# Patient Record
Sex: FEMALE | Race: WHITE | NOT HISPANIC OR LATINO | Employment: UNEMPLOYED | URBAN - METROPOLITAN AREA
[De-identification: names, ages, dates, MRNs, and addresses within clinical notes are randomized per-mention and may not be internally consistent; named-entity substitution may affect disease eponyms.]

---

## 2017-04-11 ENCOUNTER — HOSPITAL ENCOUNTER (EMERGENCY)
Facility: MEDICAL CENTER | Age: 32
End: 2017-04-11
Attending: EMERGENCY MEDICINE
Payer: MEDICAID

## 2017-04-11 ENCOUNTER — APPOINTMENT (OUTPATIENT)
Dept: RADIOLOGY | Facility: MEDICAL CENTER | Age: 32
End: 2017-04-11
Attending: EMERGENCY MEDICINE
Payer: MEDICAID

## 2017-04-11 VITALS
SYSTOLIC BLOOD PRESSURE: 123 MMHG | DIASTOLIC BLOOD PRESSURE: 81 MMHG | HEIGHT: 67 IN | HEART RATE: 92 BPM | RESPIRATION RATE: 16 BRPM | BODY MASS INDEX: 27.23 KG/M2 | WEIGHT: 173.5 LBS | TEMPERATURE: 97.9 F | OXYGEN SATURATION: 97 %

## 2017-04-11 DIAGNOSIS — R10.31 RIGHT LOWER QUADRANT ABDOMINAL PAIN: ICD-10-CM

## 2017-04-11 DIAGNOSIS — F11.20 NARCOTIC DEPENDENCE (HCC): ICD-10-CM

## 2017-04-11 LAB
ALBUMIN SERPL BCP-MCNC: 4.7 G/DL (ref 3.2–4.9)
ALBUMIN/GLOB SERPL: 1.5 G/DL
ALP SERPL-CCNC: 34 U/L (ref 30–99)
ALT SERPL-CCNC: 12 U/L (ref 2–50)
ANION GAP SERPL CALC-SCNC: 11 MMOL/L (ref 0–11.9)
APPEARANCE UR: CLEAR
AST SERPL-CCNC: 16 U/L (ref 12–45)
BASOPHILS # BLD AUTO: 0.6 % (ref 0–1.8)
BASOPHILS # BLD: 0.06 K/UL (ref 0–0.12)
BILIRUB SERPL-MCNC: 0.9 MG/DL (ref 0.1–1.5)
BUN SERPL-MCNC: 12 MG/DL (ref 8–22)
CALCIUM SERPL-MCNC: 9.6 MG/DL (ref 8.4–10.2)
CHLORIDE SERPL-SCNC: 107 MMOL/L (ref 96–112)
CO2 SERPL-SCNC: 21 MMOL/L (ref 20–33)
COLOR UR AUTO: NORMAL
CREAT SERPL-MCNC: 0.77 MG/DL (ref 0.5–1.4)
EOSINOPHIL # BLD AUTO: 0.02 K/UL (ref 0–0.51)
EOSINOPHIL NFR BLD: 0.2 % (ref 0–6.9)
ERYTHROCYTE [DISTWIDTH] IN BLOOD BY AUTOMATED COUNT: 42 FL (ref 35.9–50)
GFR SERPL CREATININE-BSD FRML MDRD: >60 ML/MIN/1.73 M 2
GLOBULIN SER CALC-MCNC: 3.1 G/DL (ref 1.9–3.5)
GLUCOSE SERPL-MCNC: 91 MG/DL (ref 65–99)
GLUCOSE UR STRIP.AUTO-MCNC: NEGATIVE MG/DL
HCG SERPL QL: NEGATIVE
HCT VFR BLD AUTO: 46.1 % (ref 37–47)
HGB BLD-MCNC: 16 G/DL (ref 12–16)
IMM GRANULOCYTES # BLD AUTO: 0.06 K/UL (ref 0–0.11)
IMM GRANULOCYTES NFR BLD AUTO: 0.6 % (ref 0–0.9)
KETONES UR STRIP.AUTO-MCNC: 80 MG/DL
LEUKOCYTE ESTERASE UR QL STRIP.AUTO: NEGATIVE
LIPASE SERPL-CCNC: 15 U/L (ref 7–58)
LYMPHOCYTES # BLD AUTO: 2.52 K/UL (ref 1–4.8)
LYMPHOCYTES NFR BLD: 24.3 % (ref 22–41)
MCH RBC QN AUTO: 30.5 PG (ref 27–33)
MCHC RBC AUTO-ENTMCNC: 34.7 G/DL (ref 33.6–35)
MCV RBC AUTO: 87.8 FL (ref 81.4–97.8)
MONOCYTES # BLD AUTO: 0.75 K/UL (ref 0–0.85)
MONOCYTES NFR BLD AUTO: 7.2 % (ref 0–13.4)
NEUTROPHILS # BLD AUTO: 6.98 K/UL (ref 2–7.15)
NEUTROPHILS NFR BLD: 67.1 % (ref 44–72)
NITRITE UR QL STRIP.AUTO: NEGATIVE
NRBC # BLD AUTO: 0 K/UL
NRBC BLD AUTO-RTO: 0 /100 WBC
PH UR STRIP.AUTO: 5 [PH] (ref 5–8)
PLATELET # BLD AUTO: 322 K/UL (ref 164–446)
PMV BLD AUTO: 9 FL (ref 9–12.9)
POTASSIUM SERPL-SCNC: 3.9 MMOL/L (ref 3.6–5.5)
PROT SERPL-MCNC: 7.8 G/DL (ref 6–8.2)
PROT UR QL STRIP: NORMAL MG/DL
RBC # BLD AUTO: 5.25 M/UL (ref 4.2–5.4)
RBC UR QL AUTO: NORMAL
SODIUM SERPL-SCNC: 139 MMOL/L (ref 135–145)
SP GR UR STRIP.AUTO: 1.03
WBC # BLD AUTO: 10.4 K/UL (ref 4.8–10.8)

## 2017-04-11 PROCEDURE — 99285 EMERGENCY DEPT VISIT HI MDM: CPT

## 2017-04-11 PROCEDURE — 74176 CT ABD & PELVIS W/O CONTRAST: CPT

## 2017-04-11 PROCEDURE — 81002 URINALYSIS NONAUTO W/O SCOPE: CPT | Performed by: EMERGENCY MEDICINE

## 2017-04-11 PROCEDURE — 85025 COMPLETE CBC W/AUTO DIFF WBC: CPT

## 2017-04-11 PROCEDURE — 80053 COMPREHEN METABOLIC PANEL: CPT

## 2017-04-11 PROCEDURE — 700105 HCHG RX REV CODE 258: Performed by: EMERGENCY MEDICINE

## 2017-04-11 PROCEDURE — 81025 URINE PREGNANCY TEST: CPT

## 2017-04-11 PROCEDURE — 96375 TX/PRO/DX INJ NEW DRUG ADDON: CPT

## 2017-04-11 PROCEDURE — 84703 CHORIONIC GONADOTROPIN ASSAY: CPT

## 2017-04-11 PROCEDURE — 76830 TRANSVAGINAL US NON-OB: CPT

## 2017-04-11 PROCEDURE — 83690 ASSAY OF LIPASE: CPT

## 2017-04-11 PROCEDURE — 700111 HCHG RX REV CODE 636 W/ 250 OVERRIDE (IP): Performed by: EMERGENCY MEDICINE

## 2017-04-11 PROCEDURE — 96376 TX/PRO/DX INJ SAME DRUG ADON: CPT

## 2017-04-11 PROCEDURE — 96374 THER/PROPH/DIAG INJ IV PUSH: CPT

## 2017-04-11 RX ORDER — SODIUM CHLORIDE 9 MG/ML
1000 INJECTION, SOLUTION INTRAVENOUS ONCE
Status: COMPLETED | OUTPATIENT
Start: 2017-04-11 | End: 2017-04-11

## 2017-04-11 RX ORDER — KETOROLAC TROMETHAMINE 30 MG/ML
30 INJECTION, SOLUTION INTRAMUSCULAR; INTRAVENOUS ONCE
Status: COMPLETED | OUTPATIENT
Start: 2017-04-11 | End: 2017-04-11

## 2017-04-11 RX ORDER — OXYCODONE AND ACETAMINOPHEN 10; 325 MG/1; MG/1
1 TABLET ORAL EVERY 6 HOURS PRN
Status: ON HOLD | COMMUNITY
End: 2018-10-06

## 2017-04-11 RX ORDER — IBUPROFEN 600 MG/1
600 TABLET ORAL
Qty: 20 TAB | Refills: 0 | Status: SHIPPED | OUTPATIENT
Start: 2017-04-11 | End: 2018-02-16

## 2017-04-11 RX ORDER — MORPHINE SULFATE 4 MG/ML
4 INJECTION, SOLUTION INTRAMUSCULAR; INTRAVENOUS
Status: COMPLETED | OUTPATIENT
Start: 2017-04-11 | End: 2017-04-11

## 2017-04-11 RX ORDER — ONDANSETRON 4 MG/1
4 TABLET, ORALLY DISINTEGRATING ORAL EVERY 8 HOURS PRN
Qty: 12 TAB | Refills: 0 | Status: SHIPPED | OUTPATIENT
Start: 2017-04-11 | End: 2018-02-16

## 2017-04-11 RX ORDER — ONDANSETRON 2 MG/ML
4 INJECTION INTRAMUSCULAR; INTRAVENOUS
Status: DISCONTINUED | OUTPATIENT
Start: 2017-04-11 | End: 2017-04-11 | Stop reason: HOSPADM

## 2017-04-11 RX ADMIN — SODIUM CHLORIDE 1000 ML: 9 INJECTION, SOLUTION INTRAVENOUS at 15:03

## 2017-04-11 RX ADMIN — MORPHINE SULFATE 4 MG: 4 INJECTION INTRAVENOUS at 15:04

## 2017-04-11 RX ADMIN — MORPHINE SULFATE 4 MG: 4 INJECTION INTRAVENOUS at 16:21

## 2017-04-11 RX ADMIN — KETOROLAC TROMETHAMINE 30 MG: 30 INJECTION, SOLUTION INTRAMUSCULAR at 17:24

## 2017-04-11 RX ADMIN — ONDANSETRON 4 MG: 2 INJECTION INTRAMUSCULAR; INTRAVENOUS at 15:04

## 2017-04-11 NOTE — ED AVS SNAPSHOT
Interactive Fate Access Code: [email or web address]  Expires: 5/11/2017  3:53 PM    Interactive Fate  A secure, online tool to manage your health information     [employer]’s Interactive Fate® is a secure, online tool that connects you to your personalized health information from the privacy of your home -- day or night - making it very easy for you to manage your healthcare. Once the activation process is completed, you can even access your medical information using the Interactive Fate oumar, which is available for free in the Apple Oumar store or Google Play store.     Interactive Fate provides the following levels of access (as shown below):   My Chart Features   [facility] Primary Care Doctor [facility]  Specialists [facility]  Urgent  Care Non-[facility]  Primary Care  Doctor   Email your healthcare team securely and privately 24/7 X X X X   Manage appointments: schedule your next appointment; view details of past/upcoming appointments X      Request prescription refills. X      View recent personal medical records, including lab and immunizations X X X X   View health record, including health history, allergies, medications X X X X   Read reports about your outpatient visits, procedures, consult and ER notes X X X X   See your discharge summary, which is a recap of your hospital and/or ER visit that includes your diagnosis, lab results, and care plan. X X       How to register for Interactive Fate:  1. Go to  [email or web address].  2. Click on the Sign Up Now box, which takes you to the New Member Sign Up page. You will need to provide the following information:  a. Enter your Interactive Fate Access Code exactly as it appears at the top of this page. (You will not need to use this code after you’ve completed the sign-up process. If you do not sign up before the expiration date, you must request a new code.)   b. Enter your date of birth.   c. Enter your home email address.   d. Click Submit, and follow the next screen’s instructions.  3. Create a Interactive Fate ID. This will be your Interactive Fate  login ID and cannot be changed, so think of one that is secure and easy to remember.  4. Create a [employer] password. You can change your password at any time.  5. Enter your Password Reset Question and Answer. This can be used at a later time if you forget your password.   6. Enter your e-mail address. This allows you to receive e-mail notifications when new information is available in Rosum.  7. Click Sign Up. You can now view your health information.    For assistance activating your [employer] account, call [phone]

## 2017-04-11 NOTE — ED NOTES
Pt to rm 22 from [facility], ambulates without assistance, agree with triage note , pt placed in gown

## 2017-04-11 NOTE — ED AVS SNAPSHOT
Home Care Instructions                                                                                                                Ally Dickerson   MRN: [id number]    Department:  [facility], Emergency Dept   Date of Visit:  4/11/2017            [facility], Emergency Dept    [address]    [place] [address]    Phone:  [phone]      You were seen by     Juan Manuel Ballesteros M.D.      Your Diagnosis Was     Right lower quadrant abdominal pain     R10.31       These are the medications you received during your hospitalization from 04/11/2017 1254 to 04/11/2017 1720     Date/Time Order Dose Route Action    04/11/2017 1503 NS infusion 1,000 mL 1,000 mL Intravenous New Bag    04/11/2017 1621 morphine (pf) 4 mg/ml injection 4 mg 4 mg Intravenous Given    04/11/2017 1504 morphine (pf) 4 mg/ml injection 4 mg 4 mg Intravenous Given    04/11/2017 1504 ondansetron (ZOFRAN) syringe/vial injection 4 mg 4 mg Intravenous Given      Follow-up Information     1. Follow up with [facility].    Specialty:  Family Medicine    Contact information    [address]  [address]  [place] [address]  [phone]          2. Follow up with Corinne E Capurro, M.D..    Specialty:  OB/Gyn    Contact information    [address]  [address]  [place] [address]  [phone]        Medication Information     Review all of your home medications and newly ordered medications with your primary doctor and/or pharmacist as soon as possible. Follow medication instructions as directed by your doctor and/or pharmacist.     Please keep your complete medication list with you and share with your physician. Update the information when medications are discontinued, doses are changed, or new medications (including over-the-counter products) are added; and carry medication information at all times in the event of emergency situations.               Medication List      START taking these medications        Instructions       Morning Afternoon Evening Bedtime    ibuprofen 600 MG Tabs   Commonly known as:  MOTRIN        Take 1 Tab by mouth 3 times a day, with meals.   Dose:  600 mg                        ondansetron 4 MG Tbdp   Commonly known as:  ZOFRAN ODT        Take 1 Tab by mouth every 8 hours as needed.   Dose:  4 mg                          ASK your doctor about these medications        Instructions    Morning Afternoon Evening Bedtime    alprazolam 0.5 MG Tabs   Commonly known as:  XANAX        Take 0.5 mg by mouth 3 times a day as needed for Sleep.   Dose:  0.5 mg                        oxycodone-acetaminophen  MG Tabs   Commonly known as:  PERCOCET-10        Take 1-2 Tabs by mouth every 6 hours as needed for Severe Pain.   Dose:  1-2 Tab                             Where to Get Your Medications      You can get these medications from any pharmacy     Bring a paper prescription for each of these medications    - ibuprofen 600 MG Tabs  - ondansetron 4 MG Tbdp            Procedures and tests performed during your visit     CBC WITH DIFFERENTIAL    COMP METABOLIC PANEL    CT-RENAL COLIC EVALUATION(A/P W/O)    ESTIMATED GFR    HCG QUAL SERUM    IV Saline Lock    LIPASE    POC URINALYSIS    POC URINE PREGNANCY    US-GYN-PELVIS TRANSVAGINAL        Discharge Instructions       Abdominal Pain, Adult  Many things can cause belly (abdominal) pain. Most times, the belly pain is not dangerous. Many cases of belly pain can be watched and treated at home.  HOME CARE   · Do not take medicines that help you go poop (laxatives) unless told to by your doctor.  · Only take medicine as told by your doctor.  · Eat or drink as told by your doctor. Your doctor will tell you if you should be on a special diet.  GET HELP IF:  · You do not know what is causing your belly pain.  · You have belly pain while you are sick to your stomach (nauseous) or have runny poop (diarrhea).  · You have pain while you pee or poop.  · Your belly pain wakes you up  at night.  · You have belly pain that gets worse or better when you eat.  · You have belly pain that gets worse when you eat fatty foods.  · You have a fever.  GET HELP RIGHT AWAY IF:   · The pain does not go away within 2 hours.  · You keep throwing up (vomiting).  · The pain changes and is only in the right or left part of the belly.  · You have bloody or tarry looking poop.  MAKE SURE YOU:   · Understand these instructions.  · Will watch your condition.  · Will get help right away if you are not doing well or get worse.     This information is not intended to replace advice given to you by your health care provider. Make sure you discuss any questions you have with your health care provider.     Document Released: 06/05/2009 Document Revised: 01/08/2016 Document Reviewed: 08/27/2014  Department of Health and Human Services Interactive Patient Education ©2016 Department of Health and Human Services Inc.            Patient Information     Patient Information    Following emergency treatment: all patient requiring follow-up care must return either to a private physician or a clinic if your condition worsens before you are able to obtain further medical attention, please return to the emergency room.     Billing Information    At [facility], we work to make the billing process streamlined for our patients.  Our Representatives are here to answer any questions you may have regarding your hospital bill.  If you have insurance coverage and have supplied your insurance information to us, we will submit a claim to your insurer on your behalf.  Should you have any questions regarding your bill, we can be reached online or by phone as follows:  Online: You are able pay your bills online or live chat with our representatives about any billing questions you may have. We are here to help Monday - Friday from 8:00am to 7:30pm and 9:00am - 12:00pm on Saturdays.  Please visit [email or web address]  for more information.   Phone:  [phone] or  [phone]    Please note that your emergency physician, surgeon, pathologist, radiologist, anesthesiologist, and other specialists are not employed by [facility] and will therefore bill separately for their services.  Please contact them directly for any questions concerning their bills at the numbers below:     Emer[facility] Physician Services:  [phone]  [facility]:  [phone]  [facility]:  [phone]  [facility]:  [phone]    1. Your final bill may vary from the amount quoted upon discharge if all procedures are not complete at that time, or if your doctor has additional procedures of which we are not aware. You will receive an additional bill if you return to the Emergency Department at [facility] for suture removal regardless of the facility of which the sutures were placed.     2. Please arrange for settlement of this account at the emergency registration.    3. All self-pay accounts are due in full at the time of treatment.  If you are unable to meet this obligation then payment is expected within 4-5 days.     4. If you have had radiology studies (CT, X-ray, Ultrasound, MRI), you have received a preliminary result during your emergency department visit. Please contact the radiology department [phone] to receive a copy of your final result. Please discuss the Final result with your primary physician or with the follow up physician provided.     Crisis Hotline:  [place] Crisis Hotline:  3-828-KZHVGLB or 1-853.139.3895  [place] Crisis Hotline:    1-268.619.1019 or 921-935-7920         ED Discharge Follow Up Questions    1. In order to provide you with very good care, we would like to follow up with a phone call in the next few days.  May we have your permission to contact you?     YES /  NO    2. What is the best phone number to call you? (       )_____-__________    3. What is the best time to call you?      Morning  /  Afternoon  /   Evening                   Patient Signature:  ____________________________________________________________    Date:  ____________________________________________________________

## 2017-04-11 NOTE — ED PROVIDER NOTES
ED Provider Note    CHIEF COMPLAINT  Chief Complaint   Patient presents with   • RLQ Pain     hx polycystic ovary disease, pain x 2 days   • N/V   • Chills       HPI  Ally Dickerson is a 31 y.o. female who presents complaining of abdominal pain. The patient had some discomfort for the last 3 days or so. However early this morning she has sudden onset of severe pain in the right lower quadrant. She describes this as a stabbing sensation. It does not radiate towards her flank, her vagina, leg. Nothing makes it better, nothing makes it worse. She's had problems with her ovaries before but is never felt anything this previously. She is associated nausea and vomiting. Her menstrual cycles are irregular which is typical for her. She denies any vaginal discharge at all. Denies any dysuria. No stool changes. No weakness or numbness. No chest pain or shortness of breath. There is no other complaint.    PAST MEDICAL HISTORY  Past Medical History   Diagnosis Date   • Bell's palsy    • Endometriosis of other specified sites    • Dental disorder    • Other specified symptom associated with female genital organs      endometriosis; polyscysic syndrome; HPV   • Pain 6/12/15     legs, abdomen, joint pain and facial spasms (8/10 pain)       FAMILY HISTORY  Family History   Problem Relation Age of Onset   • Cancer         SOCIAL HISTORY  Social History   Substance Use Topics   • Smoking status: Current Every Day Smoker -- 1.00 packs/day for 17 years     Types: Cigarettes   • Smokeless tobacco: Not on file   • Alcohol Use: No         SURGICAL HISTORY  Past Surgical History   Procedure Laterality Date   • Pelviscopy  6/13/2015     Procedure: PELVISCOPY CHRONOPERTUBATION - CRYOCAUTERY OF CERVIX;  Surgeon: Dimitris Giles M.D.;  Location: SURGERY [facility];  Service:        CURRENT MEDICATIONS  Home Medications     Reviewed by Yohan Garcia (Pharmacy Tech) on 04/11/17 at 1551  Med List Status: Complete    Medication  "Last Dose Status    alprazolam (XANAX) 0.5 MG TABS 4/1/2017 Active    oxycodone-acetaminophen (PERCOCET-10)  MG Tab 4/11/2017 Active                I reviewed the nursing notes and/or the list of the patient's medications.    ALLERGIES  No Known Allergies    REVIEW OF SYSTEMS  See HPI for further details. Review of systems as above, otherwise all other systems are negative.     PHYSICAL EXAM  VITAL SIGNS: /81 mmHg  Pulse 122  Temp(Src) 36.6 °C (97.9 °F) (Temporal)  Resp 16  Ht 1.702 m (5' 7.01\")  Wt 78.7 kg (173 lb 8 oz)  BMI 27.17 kg/m2  SpO2 97%  Constitutional: Well appearing patient in no acute distress.  Not toxic, nor ill in appearance.  HENT: Mucus membranes moist.  Oropharynx is clear.  Eyes: Pupils equally round.  No scleral icterus.   Neck: Full nontender range of motion.  Lymphatic: No cervical lymphadenopathy noted.   Cardiovascular: Regular heart rate and rhythm.  No murmurs, rubs, nor gallop appreciated.   Thorax & Lungs: Chest is nontender.  Lungs are clear to auscultation with good air movement bilaterally.  No wheeze, rhonchi, nor rales.   Abdomen: Bowel sounds normal. Soft, with focal tenderness in the right lower quadrant. However there is no rebound nor guarding.  No mass, pulsatile mass, nor hepatosplenomegaly appreciated.  No CVA tenderness.  No Alarcon sign.  : Deferred  Skin: No purpura nor petechia noted.  Extremities/Musculoskeletal: No sign of trauma.  Calves are nontender with no cords nor edema.  Neurologic: Alert & oriented.  Strength and sensation is intact all around.  Gait is normal.  Psychiatric: Normal affect appropriate for the clinical situation.    LABS  Labs Reviewed   CBC WITH DIFFERENTIAL   COMP METABOLIC PANEL   LIPASE   HCG QUAL SERUM   ESTIMATED GFR   POC URINE PREGNANCY   POC URINALYSIS         RADIOLOGY/PROCEDURES  I have reviewed the patient's films myself, and they are read out by the radiologist as:   CT-RENAL COLIC EVALUATION(A/P W/O)   Final " Result         1. No urinary tract calculus identified. No renal collecting system in dilatation.      2. No evidence of inflammatory change in the abdomen or pelvis.  The study is however limited due to nonuse of intravenous contrast      3. Normal appendix.      US-GYN-PELVIS TRANSVAGINAL   Final Result            1. Small amount of free fluid in the right adnexa and cul-de-sac.. Otherwise, unremarkable uterus and bilateral ovaries.        .    MEDICAL RECORD  I have reviewed patient's medical record and pertinent results are listed above.    COURSE & MEDICAL DECISION MAKING  This patient presents with abdominal pain. Given IV fluids and pain medicine.  At this point, it is unclear what the cause of the patient's symptoms are. Clinically, the patient is not dehydrated nor do they evidence of a surgical abdomen. The patient has been reevaluated after the primary assessment--including prior to discharge--and still has no evidence of a surgical abdomen. Laboratories are reassuring. There is no pronounced leukocytosis or bandemia. There is no enzymatic evidence of pancreatitis or hepatitis. No evidence of UTI.  The patient is not pregnant, nor is there symptom of PID.  Imaging shows some small free fluid on ultrasound, negative otherwise. Unremarkable CT without evidence of obstruction inflammatory changes, kidney stone.  As I counseled the patient, this could be early in the course of the disease process, such as early appendicitis, and we are simply unable to diagnose it at this time. However, given the duration of her symptoms, I think this is unlikely. I have asked her to follow-up with her personal doctor for recheck, consider a new gynecologist; she previously seen Dr. Dimitris Giles. Patient is on a chronic pain regimen prescribed by Dr. dumont. I've added ibuprofen. Zofran. The structures on belly pain.      FINAL IMPRESSION  1. Right lower quadrant abdominal pain    2. Narcotic dependence ([facility])            This dictation was created using voice recognition software.     Electronically signed by: Juan Manuel Ballesteros, 4/11/2017 3:59 PM

## 2017-04-11 NOTE — ED NOTES
Pt returned from CT. States some pain relief, pt is tearful. Pt is feeling fearful awaiting results. RN reassured the pt and updated on plan of care. Significant other at bedside.

## 2017-04-12 LAB — HCG UR QL: NEGATIVE

## 2017-04-12 NOTE — ED NOTES
Pt discharged home. Assessment complete. Pt ambulates self. VS stable. Pt verbalized discharge instructions. Prescriptions given pt verbalizes teaching provided.

## 2018-02-16 DIAGNOSIS — Z01.812 PRE-OPERATIVE LABORATORY EXAMINATION: ICD-10-CM

## 2018-02-16 LAB
ANION GAP SERPL CALC-SCNC: 8 MMOL/L (ref 0–11.9)
BUN SERPL-MCNC: 13 MG/DL (ref 8–22)
CALCIUM SERPL-MCNC: 10.5 MG/DL (ref 8.5–10.5)
CHLORIDE SERPL-SCNC: 102 MMOL/L (ref 96–112)
CO2 SERPL-SCNC: 23 MMOL/L (ref 20–33)
CREAT SERPL-MCNC: 0.67 MG/DL (ref 0.5–1.4)
ERYTHROCYTE [DISTWIDTH] IN BLOOD BY AUTOMATED COUNT: 43.1 FL (ref 35.9–50)
GLUCOSE SERPL-MCNC: 103 MG/DL (ref 65–99)
HCG SERPL QL: POSITIVE
HCT VFR BLD AUTO: 47.3 % (ref 37–47)
HGB BLD-MCNC: 15.9 G/DL (ref 12–16)
MCH RBC QN AUTO: 30.4 PG (ref 27–33)
MCHC RBC AUTO-ENTMCNC: 33.6 G/DL (ref 33.6–35)
MCV RBC AUTO: 90.4 FL (ref 81.4–97.8)
PLATELET # BLD AUTO: 392 K/UL (ref 164–446)
PMV BLD AUTO: 9.5 FL (ref 9–12.9)
POTASSIUM SERPL-SCNC: 3.5 MMOL/L (ref 3.6–5.5)
RBC # BLD AUTO: 5.23 M/UL (ref 4.2–5.4)
SODIUM SERPL-SCNC: 133 MMOL/L (ref 135–145)
WBC # BLD AUTO: 9.6 K/UL (ref 4.8–10.8)

## 2018-02-16 PROCEDURE — 85027 COMPLETE CBC AUTOMATED: CPT

## 2018-02-16 PROCEDURE — 36415 COLL VENOUS BLD VENIPUNCTURE: CPT

## 2018-02-16 PROCEDURE — 80048 BASIC METABOLIC PNL TOTAL CA: CPT

## 2018-02-16 PROCEDURE — 84703 CHORIONIC GONADOTROPIN ASSAY: CPT

## 2018-02-16 RX ORDER — CHOLECALCIFEROL (VITAMIN D3) 125 MCG
1 CAPSULE ORAL NIGHTLY PRN
COMMUNITY

## 2018-02-16 RX ORDER — HYDROCODONE BITARTRATE AND ACETAMINOPHEN 10; 325 MG/1; MG/1
1 TABLET ORAL PRN
Status: ON HOLD | COMMUNITY
End: 2018-10-06

## 2018-02-17 NOTE — H&P
HISTORY OF PRESENT ILLNESS:  The patient is a 32-year-old white female    0, para 0 who has had pelvic pain for 9 years.  .  The patient's mother is    secondary to ovarian cancer.  The patient's pain is 10/10 on the pain   scale.  The patient complains also of menorrhagia, heavy abnormal uterine   bleeding leading for 13 days using up to 9 pads or tampons in one day.  The   patient had been placed on oral contraceptive pills to control abnormal heavy   uterine bleeding.  The patient voluntarily discontinued her oral contraceptive   pills.  The patient requests a laparoscopically-assisted vaginal   hysterectomy, bilateral salpingo-oophorectomy due to her family history of   ovarian cancer, menorrhagia, and pelvic pain.  The patient states that she   does not wish to have children.  The patient has undergone previous   laparoscopic operation performed by Dr. Dimitris Giles.  Laparoscopic procedure   revealed no spill from the right fallopian tube.    PAST MEDICAL HISTORY:  The patient's past medical history is significant for   abnormal Pap smear, atypical squamous cells of undetermined significance; HPV   high risk positive.  She has a history of infertility and oxycodone   dependence.  The patient's past medical history is significant for Bell's   palsy, endometriosis, tobacco abuse, dental abscess.    CURRENT MEDICATION:  Includes oxycodone (Percocet 10/325 one tab q. 4 hours   provided by another provider).  Patient also takes alprazolam 0.5 mg _____   daily.    PAST SURGICAL HISTORY:  Includes laparoscopic chromoperturbation, cryosurgery   for abnormal Pap smear, diagnostic laparoscopy.    ALLERGIES:  No known allergies.    HABITS:  The patient smokes three-quarter to one pack of cigarettes a day and   has done so for 16 years.  She denies use of alcohol.  She states she has   never been treated for drug abuse and occasionally she will use recreational   drugs as needed for pain, anxiety,  ____.    REVIEW OF SYSTEMS:  CONSTITUTIONAL:  The patient denies any fever, chills, sweats, weight loss,   weakness, or fatigue.  EYES:  She denies any change in vision.  EAR, NOSE AND THROAT:  She denies any change in hearing, nosebleed, sore   throat, or dry mouth.  CARDIOVASCULAR:  She denies any dizziness, shortness breath, chest pain, loss   of consciousness, or heart palpitations.  RESPIRATORY:  The patient denies any chest pain, cough, shortness of breath,   or wheezing.  GASTROINTESTINAL:  She denies any abdominal pain, nausea, vomiting, change in   bowel habits, or change in appetite.  She denies any dark or bloody stools,   indigestion, constipation, or diarrhea.  HEMATOLOGICAL:  She complains of swollen lymph glands and easy bruisability.  GYNECOLOGICAL:  The patient complains of pelvic pain and bleeding and pain   with sexual intercourse.  URINARY:  She denies any painful urination, frequent urination, urgent   urination, blood in the urine, leaking of urination, or nocturia.  MUSCULOSKELETAL:  She complains of back pain, joint pain, joint stiffness, and   joint swelling.  INTEGUMENT:  She denies any hair loss, hair growth, or change in hair texture.    She denies any breast lumps, nipple discharge, or breast pain.  NEUROLOGICAL:  She complains of depression, anxiety, mood swings, and numbness   and tingling as well as Bell's palsy.  ENDOCRINE:  She complains of cold and heat intolerance.  She denies any   excessive thirst, excessive urination, or tremor.    PHYSICAL EXAMINATION:  GENERAL:  This is a well-developed female, in no apparent distress.  VITAL SIGNS:  She is 67.5 inches tall, 169 pounds, blood pressure 110/64,   pulse 81, respirations 16, pulse oximetry 97% on room air.  HEENT:  Head is normocephalic without sign of trauma.  History of Bell's   palsy.  SKIN:  No rashes, changes, or cyanosis.  NECK:  Supple.  Full range of motion.  Trachea midline.  No thyromegaly.  LUNGS:  Clear to  auscultation and percussion.  HEART:  S1, S2 normal.  No S3, S4.  No lifts, rubs, or heaves.  BREASTS:  Reveal no lumps, masses, nipple discharge, supraclavicular   adenopathy, or axial adenopathy.  ABDOMEN:  Flat.  Bowel sounds positive.  No hepatomegaly, no splenomegaly.  No   tenderness, guarding, rigidity, or rebound.  MUSCULOSKELETAL:  Reveals tingling and pain of the face.  PELVIC:  Female escutcheon.  Bartholin, urethral, and [place]'s glands are   normal.  Lymphatic groin nodes are not enlarged.  Vaginal estrogen effect is   present.  Bladder:  No fullness, masses, or tenderness.  Cystocele absent.    Cervix:  Nontender to motion.  Uterus:  7-8 cm in size.  No adnexal masses.    IMPRESSION:  1.  She has had pelvic pain for 9 years.  2.  Menorrhagia.  3.  Dysmenorrhea.  4.  Polycystic ovarian syndrome.  5.  History of mild dysplasia, atypical squamous cells of undetermined   significance Pap smear, human papilloma virus positive.  6.  History of infertility.  7.  Oxycodone dependent.  8.  History of endometriosis.  9.  Tobacco abuse.  10.  Family history of ovarian cancer - mother.  11.  Status post laparoscopy and laparotomy.    RECOMMENDATION:  I have had a long thorough discussion with the patient.  The   patient does not wish to have children.  She understands that she will not be   able to have children following the hysterectomy and bilateral   salpingo-oophorectomy.  Principally, the patient wishes the bilateral   salpingo-oophorectomy due to her family history of ovarian cancer of her   mother.  She also has a history of polycystic ovarian syndrome.  We have   discussed replacement therapy to include either estrogen alone or estrogen and   progesterone replacement therapy.  I have discussed some serious and   significant risks to include but not limited to the risks of anesthesia,   infection, bleeding, injury to the bowel, bladder, ureter, or pelvic vessels.    The patient understands that she will be  menopausal.  I have discussed   postoperative pain treatments.  I have recommended the patient to obtain a   pain specialist.  I have informed the patient that I will not be giving   excessive amounts of oxycodone.  I have given the patient no guarantees, nor   implied, or any guarantees regarding pelvic pain following the surgery.    Notably, this patient has not been guarantee that she will be pain free   following the surgery.  Informed consent was received.  All the patient's   questions were answered to her satisfaction.  I described the procedure of   laparoscopic-assisted vaginal hysterectomy, bilateral salpingo-oophorectomy,   removal of the fallopian tubes, and both ovaries as well as cystoscopy.    Informed consent was received.  All the patient's questions were answered to   her satisfaction.  I have reviewed the patient's previous records from Dr. Giles.  The patient requests laparoscopic-assisted vaginal hysterectomy,   bilateral salpingo-oophorectomy, and cystoscopy.  The patient also requested   excision of a vulvar condyloma at the time of surgery.       ____________________________________     MD ROSELYN FAGAN / LIZETTE    DD:  02/16/2018 22:24:42  DT:  02/16/2018 19:58:33    D#:  [id number]  Job#:  [id number]

## 2018-02-17 NOTE — H&P
HISTORY OF PRESENT ILLNESS:  The patient is a 32-year-old white female,    0, para 0 whose chief complaint is chronic pelvic pain.  The patient   has experienced pelvic pain for 9 years.  The patient's pain is 10/10 on the   pain scale.  The patient's mother is  secondary to ovarian cancer.    The patient complains also of menorrhagia, heavy abnormal uterine bleeding,   bleeding for 13 days using up to 9 pads or tampons in one day.  The patient   had been placed on oral contraceptive pills to control abnormal heavy uterine   bleeding.  The patient voluntarily discontinued her oral contraceptive pills.    The patient requests a laparoscopically-assisted vaginal hysterectomy,   bilateral salpingo-oophorectomy due to her family history of ovarian cancer,   menorrhagia, and pelvic pain.  The patient states that she does not wish to   have children.  The patient has undergone previous laparoscopic operation   performed by Dr. Dimitris Giles.  Laparoscopic procedure revealed no spill from   the right fallopian tube.    PAST MEDICAL HISTORY:  The patient's past medical history is significant for   abnormal Pap smear, atypical squamous cells of undetermined significance; HPV   high risk positive.  She has a history of infertility and oxycodone   dependence.  The patient's past medical history is significant for Bell's   palsy, endometriosis, tobacco abuse, dental abscess.    CURRENT MEDICATION:  Includes oxycodone (Percocet 10/325 one tab q. 4 hours   provided by another provider).  Patient also takes alprazolam 0.5 mg ___   daily.    PAST SURGICAL HISTORY:  Includes laparoscopic chromoperturbation, cryosurgery   for abnormal Pap smear, diagnostic laparoscopy.    ALLERGIES:  No known allergies.    HABITS:  The patient smokes three-quarters to one pack of cigarettes a day and   has done so for 16 years.  She denies use of alcohol.  She states she has   never been treated for drug abuse and occasionally she will  use recreational   drugs as needed for pain, anxiety, and as a sleep aid.    REVIEW OF SYSTEMS:  CONSTITUTIONAL:  The patient denies any fever, chills, sweats, weight loss,   weakness, or fatigue.  EYES:  She denies any change in vision.  EARS, NOSE AND THROAT:  She denies any change in hearing, nosebleed, sore   throat, or dry mouth.  CARDIOVASCULAR:  She denies any dizziness, shortness of breath, chest pain,   loss of consciousness, or heart palpitations.  RESPIRATORY:  The patient denies any chest pain, cough, shortness of breath,   or wheezing.  GASTROINTESTINAL:  She denies any abdominal pain, nausea, vomiting, change in   bowel habits, or change in appetite.  She denies any dark or bloody stools,   indigestion, constipation, or diarrhea.  HEMATOLOGICAL:  She complains of swollen lymph glands and easy bruisability.  GYNECOLOGICAL:  The patient complains of pelvic pain and bleeding and pain   with sexual intercourse.  URINARY:  She denies any painful urination, frequent urination, urgent   urination, blood in the urine, leaking of urination, or nocturia.  MUSCULOSKELETAL:  She complains of back pain, joint pain, joint stiffness, and   joint swelling.  INTEGUMENT:  She denies any hair loss, hair growth, change in hair texture.    She denies any breast lumps, nipple discharge, or breast pain.  NEUROLOGICAL:  She complains of depression, anxiety, mood swings, and numbness   and tingling, as well as Bell's palsy.  ENDOCRINE:  She complains of cold and heat intolerance.  She denies any   excessive thirst, excessive urination, or tremor.    PHYSICAL EXAMINATION:  GENERAL:  This is a well-developed female, in no apparent distress.  VITAL SIGNS:  She is 67.5 inches tall, 169 pounds, blood pressure 110/64,   pulse 81, respirations 16, pulse oximetry 97% on room air.  HEENT:  Head is normocephalic without sign of trauma.  History of Bell's   palsy.  SKIN:  No rashes, changes, or cyanosis.  NECK:  Supple.  Full range of  motion.  Trachea midline.  No thyromegaly.  LUNGS:  Clear to auscultation and percussion.  HEART:  S1, S2 normal.  No S3, S4.  No lifts, rubs, or heaves.  BREASTS:  Reveal no lumps, masses, nipple discharge, supraclavicular   adenopathy, or axial adenopathy.  ABDOMEN:  Flat.  Bowel sounds positive.  No hepatomegaly, no splenomegaly.  No   tenderness, guarding, rigidity, or rebound.  ___:  Reveals tingling and pain of the face.  PELVIC:  Female escutcheon.  Bartholin, urethral, and [place]'s glands are   normal.  Lymphatic:  Groin nodes are not enlarged.  Vaginal estrogen effect is   present.  Bladder:  No fullness, masses, or tenderness.  Cystocele absent.    Cervix:  Nontender to motion.  Uterus:  7-8 cm in size.  No adnexal masses.    IMPRESSION:  1.  She has had pelvic pain for 9 years.  2.  Menorrhagia.  3.  Dysmenorrhea.  4.  Polycystic ovarian syndrome.  5.  History of mild dysplasia, atypical squamous cells of undetermined   significance Pap smear, human papilloma virus positive.  6.  History of infertility.  7.  Oxycodone dependent.  8.  History of endometriosis.  9.  Tobacco abuse.  10.  Family history of ovarian cancer - mother.  11.  Status post laparoscopy and laparotomy.    RECOMMENDATION:  I have had a long thorough discussion with the patient.  The   patient does not wish to have children.  She understands that she will not be   able to have children following the hysterectomy and bilateral   salpingo-oophorectomy.  Principally, the patient wishes the bilateral   salpingo-oophorectomy due to her family history of ovarian cancer of her   mother.  She also has a history of polycystic ovarian syndrome.  We have   discussed replacement therapy to include either estrogen alone or estrogen and   progesterone replacement therapy.  I have discussed some serious and   significant risks to include but not limited to the risks of anesthesia,   infection, bleeding, injury to the bowel, bladder, ureter, or pelvic  vessels.    The patient understands that she will be menopausal.  I have discussed   postoperative pain treatments.  I have recommended the patient to obtain a   pain specialist.  I have informed the patient that I will not be giving   excessive amounts of oxycodone.  I have given the patient no guarantees, nor   implied any guarantees, regarding pelvic pain following the surgery.  In other   words, the patient has not been guaranteed that she will be pain free   following the surgery.  Informed consent was received.  All the patient's   questions were answered to her satisfaction.  I described the procedure of   laparoscopic-assisted vaginal hysterectomy, bilateral salpingo-oophorectomy,   removal of the fallopian tubes and both ovaries, as well as cystoscopy.    Informed consent was received.  All the patient's questions were answered to   her satisfaction.  I have reviewed the patient's previous records from Dr. Giles.  The patient requests laparoscopic-assisted vaginal hysterectomy,   bilateral salpingo-oophorectomy, and cystoscopy.  The patient also requested   excision of a vulvar condyloma at the time of surgery.       ____________________________________     MD ROSELYN FAGAN / LIZETTE    DD:  02/16/2018 17:24:42  DT:  02/16/2018 19:58:33    D#:  [id number]  Job#:  [id number]

## 2018-02-20 ENCOUNTER — HOSPITAL ENCOUNTER (OUTPATIENT)
Facility: MEDICAL CENTER | Age: 33
End: 2018-02-20
Attending: OBSTETRICS & GYNECOLOGY | Admitting: OBSTETRICS & GYNECOLOGY
Payer: MEDICAID

## 2018-02-20 VITALS
HEIGHT: 68 IN | BODY MASS INDEX: 25.46 KG/M2 | DIASTOLIC BLOOD PRESSURE: 72 MMHG | TEMPERATURE: 97.8 F | SYSTOLIC BLOOD PRESSURE: 113 MMHG | OXYGEN SATURATION: 99 % | WEIGHT: 167.99 LBS | RESPIRATION RATE: 18 BRPM

## 2018-02-20 LAB
B-HCG SERPL-ACNC: 9520.5 MIU/ML (ref 0–5)
HCG UR QL: POSITIVE
SP GR UR REFRACTOMETRY: 1.03

## 2018-02-20 PROCEDURE — 700101 HCHG RX REV CODE 250

## 2018-02-20 PROCEDURE — 700111 HCHG RX REV CODE 636 W/ 250 OVERRIDE (IP)

## 2018-02-20 PROCEDURE — 81025 URINE PREGNANCY TEST: CPT

## 2018-02-20 PROCEDURE — 700104 HCHG RX REV CODE 254

## 2018-02-20 PROCEDURE — 84702 CHORIONIC GONADOTROPIN TEST: CPT

## 2018-02-20 RX ORDER — LIDOCAINE HYDROCHLORIDE 10 MG/ML
INJECTION, SOLUTION INFILTRATION; PERINEURAL
Status: DISCONTINUED
Start: 2018-02-20 | End: 2018-02-20 | Stop reason: HOSPADM

## 2018-02-20 RX ORDER — MIDAZOLAM HYDROCHLORIDE 1 MG/ML
INJECTION INTRAMUSCULAR; INTRAVENOUS
Status: DISCONTINUED
Start: 2018-02-20 | End: 2018-02-20 | Stop reason: HOSPADM

## 2018-02-20 RX ORDER — LIDOCAINE AND PRILOCAINE 25; 25 MG/G; MG/G
1 CREAM TOPICAL
Status: DISCONTINUED | OUTPATIENT
Start: 2018-02-20 | End: 2018-02-20 | Stop reason: HOSPADM

## 2018-02-20 RX ORDER — SODIUM CHLORIDE, SODIUM LACTATE, POTASSIUM CHLORIDE, CALCIUM CHLORIDE 600; 310; 30; 20 MG/100ML; MG/100ML; MG/100ML; MG/100ML
1000 INJECTION, SOLUTION INTRAVENOUS
Status: COMPLETED | OUTPATIENT
Start: 2018-02-20 | End: 2018-02-20

## 2018-02-20 RX ORDER — LIDOCAINE HYDROCHLORIDE 10 MG/ML
0.5 INJECTION, SOLUTION INFILTRATION; PERINEURAL
Status: DISCONTINUED | OUTPATIENT
Start: 2018-02-20 | End: 2018-02-20 | Stop reason: HOSPADM

## 2018-02-20 RX ADMIN — SODIUM CHLORIDE, SODIUM LACTATE, POTASSIUM CHLORIDE, CALCIUM CHLORIDE 1000 ML: 600; 310; 30; 20 INJECTION, SOLUTION INTRAVENOUS at 06:38

## 2018-02-20 ASSESSMENT — PAIN SCALES - GENERAL: PAINLEVEL_OUTOF10: 0

## 2018-02-21 NOTE — DISCHARGE SUMMARY
HOSPITAL COURSE:  The patient is a 32-year-old white female scheduled for   hysterectomy for reasons of menorrhagia, pelvic pain, pelvic endometriosis,   family history of maternal ovarian cancer, and history of ASCUS Pap.  The   patient's urine pregnancy test was found to be positive.  A quantitative hCG   was performed and the quantitative hCG, I was informed was greater than 9000   international units.  The patient's surgery was canceled due to her positive   pregnancy test.  The patient will be referred to Dr. Dimitris Giles for   obstetrical care.       ____________________________________     MD ROSELYN FAGAN / LIZETTE    DD:  02/20/2018 08:15:50  DT:  02/20/2018 16:17:44    D#:  [id number]  Job#:  [id number]

## 2018-07-01 ENCOUNTER — HOSPITAL ENCOUNTER (OUTPATIENT)
Facility: MEDICAL CENTER | Age: 33
End: 2018-07-01
Attending: SPECIALIST | Admitting: SPECIALIST
Payer: MEDICAID

## 2018-07-01 VITALS
SYSTOLIC BLOOD PRESSURE: 131 MMHG | WEIGHT: 190 LBS | DIASTOLIC BLOOD PRESSURE: 82 MMHG | BODY MASS INDEX: 28.79 KG/M2 | HEIGHT: 68 IN | HEART RATE: 96 BPM

## 2018-07-01 PROCEDURE — 700111 HCHG RX REV CODE 636 W/ 250 OVERRIDE (IP): Performed by: SPECIALIST

## 2018-07-01 PROCEDURE — 96372 THER/PROPH/DIAG INJ SC/IM: CPT

## 2018-07-01 RX ORDER — BETAMETHASONE SODIUM PHOSPHATE AND BETAMETHASONE ACETATE 3; 3 MG/ML; MG/ML
12 INJECTION, SUSPENSION INTRA-ARTICULAR; INTRALESIONAL; INTRAMUSCULAR; SOFT TISSUE ONCE
Status: COMPLETED | OUTPATIENT
Start: 2018-07-01 | End: 2018-07-01

## 2018-07-01 RX ADMIN — BETAMETHASONE SODIUM PHOSPHATE AND BETAMETHASONE ACETATE 12 MG: 3; 3 INJECTION, SUSPENSION INTRA-ARTICULAR; INTRALESIONAL; INTRAMUSCULAR at 14:07

## 2018-07-01 NOTE — PROGRESS NOTES
1345-pt presents from home for scheduled betamethasone shot, pt has a shortened cervix and was told to start betamethasone, no c/o leaking, bleeding, pain or uc's, states baby is moving normally, placed on external monitors, vs taken, betamethasone ordered per Dr Giles  1407-betamethasone given, pt educated, verbalized understanding  1415-TC Dr Giles, report given, no uc's noted, discharge order received  1420-pt discharged home with PTL precautions and instructions to return tomorrow for second dose, verbalized understanding, left ambulatory on her own

## 2018-07-02 ENCOUNTER — HOSPITAL ENCOUNTER (OUTPATIENT)
Facility: MEDICAL CENTER | Age: 33
End: 2018-07-02
Attending: SPECIALIST | Admitting: SPECIALIST
Payer: MEDICAID

## 2018-07-02 VITALS — WEIGHT: 198 LBS | BODY MASS INDEX: 30.11 KG/M2

## 2018-07-02 PROCEDURE — 700111 HCHG RX REV CODE 636 W/ 250 OVERRIDE (IP): Performed by: SPECIALIST

## 2018-07-02 PROCEDURE — 96372 THER/PROPH/DIAG INJ SC/IM: CPT

## 2018-07-02 RX ORDER — BETAMETHASONE SODIUM PHOSPHATE AND BETAMETHASONE ACETATE 3; 3 MG/ML; MG/ML
12 INJECTION, SUSPENSION INTRA-ARTICULAR; INTRALESIONAL; INTRAMUSCULAR; SOFT TISSUE EVERY 24 HOURS
Status: DISCONTINUED | OUTPATIENT
Start: 2018-07-02 | End: 2018-07-02 | Stop reason: HOSPADM

## 2018-07-02 RX ADMIN — BETAMETHASONE SODIUM PHOSPHATE AND BETAMETHASONE ACETATE 12 MG: 3; 3 INJECTION, SUSPENSION INTRA-ARTICULAR; INTRALESIONAL; INTRAMUSCULAR at 14:04

## 2018-07-02 NOTE — PROGRESS NOTES
DANIEL 10/16 EGA 24.6    Positive FM, denies LOF, VB or UC's    PT presenting for 2nd steroid shot.    1400 Call to Chan to Chan, okay to give steroid and DC with labor precautions.     1410 Labor precautions given, PT to return for LOF, VB or UC's.

## 2018-09-25 ENCOUNTER — HOSPITAL ENCOUNTER (OUTPATIENT)
Facility: MEDICAL CENTER | Age: 33
End: 2018-09-25
Attending: SPECIALIST | Admitting: SPECIALIST
Payer: MEDICAID

## 2018-09-25 VITALS — HEART RATE: 92 BPM | DIASTOLIC BLOOD PRESSURE: 74 MMHG | SYSTOLIC BLOOD PRESSURE: 139 MMHG

## 2018-09-25 LAB
ALBUMIN SERPL BCP-MCNC: 3.7 G/DL (ref 3.2–4.9)
ALBUMIN/GLOB SERPL: 1.2 G/DL
ALP SERPL-CCNC: 101 U/L (ref 30–99)
ALT SERPL-CCNC: 10 U/L (ref 2–50)
ANION GAP SERPL CALC-SCNC: 11 MMOL/L (ref 0–11.9)
APPEARANCE UR: ABNORMAL
AST SERPL-CCNC: 14 U/L (ref 12–45)
BASOPHILS # BLD AUTO: 0.3 % (ref 0–1.8)
BASOPHILS # BLD: 0.04 K/UL (ref 0–0.12)
BILIRUB SERPL-MCNC: 0.2 MG/DL (ref 0.1–1.5)
BUN SERPL-MCNC: 8 MG/DL (ref 8–22)
CALCIUM SERPL-MCNC: 9.9 MG/DL (ref 8.5–10.5)
CHLORIDE SERPL-SCNC: 104 MMOL/L (ref 96–112)
CO2 SERPL-SCNC: 22 MMOL/L (ref 20–33)
COLOR UR AUTO: ABNORMAL
CREAT SERPL-MCNC: 0.69 MG/DL (ref 0.5–1.4)
CREAT UR-MCNC: 151.3 MG/DL
EOSINOPHIL # BLD AUTO: 0.07 K/UL (ref 0–0.51)
EOSINOPHIL NFR BLD: 0.5 % (ref 0–6.9)
ERYTHROCYTE [DISTWIDTH] IN BLOOD BY AUTOMATED COUNT: 45.1 FL (ref 35.9–50)
GLOBULIN SER CALC-MCNC: 3 G/DL (ref 1.9–3.5)
GLUCOSE SERPL-MCNC: 94 MG/DL (ref 65–99)
GLUCOSE UR QL STRIP.AUTO: NEGATIVE MG/DL
HCT VFR BLD AUTO: 40.5 % (ref 37–47)
HGB BLD-MCNC: 13.4 G/DL (ref 12–16)
IMM GRANULOCYTES # BLD AUTO: 0.11 K/UL (ref 0–0.11)
IMM GRANULOCYTES NFR BLD AUTO: 0.8 % (ref 0–0.9)
KETONES UR QL STRIP.AUTO: 15 MG/DL
LEUKOCYTE ESTERASE UR QL STRIP.AUTO: NEGATIVE
LYMPHOCYTES # BLD AUTO: 2.46 K/UL (ref 1–4.8)
LYMPHOCYTES NFR BLD: 18.7 % (ref 22–41)
MCH RBC QN AUTO: 30.5 PG (ref 27–33)
MCHC RBC AUTO-ENTMCNC: 33.1 G/DL (ref 33.6–35)
MCV RBC AUTO: 92 FL (ref 81.4–97.8)
MONOCYTES # BLD AUTO: 0.94 K/UL (ref 0–0.85)
MONOCYTES NFR BLD AUTO: 7.1 % (ref 0–13.4)
NEUTROPHILS # BLD AUTO: 9.53 K/UL (ref 2–7.15)
NEUTROPHILS NFR BLD: 72.6 % (ref 44–72)
NITRITE UR QL STRIP.AUTO: NEGATIVE
NRBC # BLD AUTO: 0 K/UL
NRBC BLD-RTO: 0 /100 WBC
PH UR STRIP.AUTO: 5.5 [PH]
PLATELET # BLD AUTO: 321 K/UL (ref 164–446)
PMV BLD AUTO: 10.3 FL (ref 9–12.9)
POTASSIUM SERPL-SCNC: 3.6 MMOL/L (ref 3.6–5.5)
PROT SERPL-MCNC: 6.7 G/DL (ref 6–8.2)
PROT UR QL STRIP: ABNORMAL MG/DL
PROT UR-MCNC: 18.6 MG/DL (ref 0–15)
PROT/CREAT UR: 123 MG/G (ref 10–107)
RBC # BLD AUTO: 4.4 M/UL (ref 4.2–5.4)
RBC UR QL AUTO: ABNORMAL
SODIUM SERPL-SCNC: 137 MMOL/L (ref 135–145)
SP GR UR: >=1.03
URATE SERPL-MCNC: 5.2 MG/DL (ref 1.9–8.2)
WBC # BLD AUTO: 13.2 K/UL (ref 4.8–10.8)

## 2018-09-25 PROCEDURE — 80053 COMPREHEN METABOLIC PANEL: CPT

## 2018-09-25 PROCEDURE — 82570 ASSAY OF URINE CREATININE: CPT

## 2018-09-25 PROCEDURE — 85025 COMPLETE CBC W/AUTO DIFF WBC: CPT

## 2018-09-25 PROCEDURE — 36415 COLL VENOUS BLD VENIPUNCTURE: CPT

## 2018-09-25 PROCEDURE — 81002 URINALYSIS NONAUTO W/O SCOPE: CPT

## 2018-09-25 PROCEDURE — 59025 FETAL NON-STRESS TEST: CPT

## 2018-09-25 PROCEDURE — 84156 ASSAY OF PROTEIN URINE: CPT

## 2018-09-25 PROCEDURE — 84550 ASSAY OF BLOOD/URIC ACID: CPT

## 2018-09-26 NOTE — PROGRESS NOTES
1655-pt presents from the office for PIH work up due to elevated pressures, Dr Giles called ahead with orders for PIH labs, no c/o leaking, bleeding, pain or uc's, states baby is moving normally, placed on external monitors, vs taken and set for q 10 min, labs drawn and sent, denies headache, visual disturbances and RUQ pain, reflexes normal, no clonus, 1+ pitting edema in BL lower extremities  1825-labs back, TC Dr Giles, report given, reviewed labs and bps, discharge order received  1830-pt discharged home with labor precautions and understanding PIH, verbalized understanding, left ambulatory on her own

## 2018-10-04 ENCOUNTER — HOSPITAL ENCOUNTER (INPATIENT)
Facility: MEDICAL CENTER | Age: 33
LOS: 2 days | End: 2018-10-06
Attending: SPECIALIST | Admitting: SPECIALIST
Payer: MEDICAID

## 2018-10-04 LAB
BASOPHILS # BLD AUTO: 0.4 % (ref 0–1.8)
BASOPHILS # BLD: 0.07 K/UL (ref 0–0.12)
EOSINOPHIL # BLD AUTO: 0.05 K/UL (ref 0–0.51)
EOSINOPHIL NFR BLD: 0.3 % (ref 0–6.9)
ERYTHROCYTE [DISTWIDTH] IN BLOOD BY AUTOMATED COUNT: 43.7 FL (ref 35.9–50)
HCT VFR BLD AUTO: 39.4 % (ref 37–47)
HGB BLD-MCNC: 13.1 G/DL (ref 12–16)
HOLDING TUBE BB 8507: NORMAL
IMM GRANULOCYTES # BLD AUTO: 0.13 K/UL (ref 0–0.11)
IMM GRANULOCYTES NFR BLD AUTO: 0.7 % (ref 0–0.9)
LYMPHOCYTES # BLD AUTO: 2.19 K/UL (ref 1–4.8)
LYMPHOCYTES NFR BLD: 11.4 % (ref 22–41)
MCH RBC QN AUTO: 29.8 PG (ref 27–33)
MCHC RBC AUTO-ENTMCNC: 33.2 G/DL (ref 33.6–35)
MCV RBC AUTO: 89.5 FL (ref 81.4–97.8)
MONOCYTES # BLD AUTO: 1.17 K/UL (ref 0–0.85)
MONOCYTES NFR BLD AUTO: 6.1 % (ref 0–13.4)
NEUTROPHILS # BLD AUTO: 15.63 K/UL (ref 2–7.15)
NEUTROPHILS NFR BLD: 81.1 % (ref 44–72)
NRBC # BLD AUTO: 0 K/UL
NRBC BLD-RTO: 0 /100 WBC
PLATELET # BLD AUTO: 282 K/UL (ref 164–446)
PMV BLD AUTO: 10 FL (ref 9–12.9)
RBC # BLD AUTO: 4.4 M/UL (ref 4.2–5.4)
WBC # BLD AUTO: 19.2 K/UL (ref 4.8–10.8)

## 2018-10-04 PROCEDURE — 700105 HCHG RX REV CODE 258

## 2018-10-04 PROCEDURE — 85025 COMPLETE CBC W/AUTO DIFF WBC: CPT

## 2018-10-04 PROCEDURE — 700101 HCHG RX REV CODE 250

## 2018-10-04 PROCEDURE — 700105 HCHG RX REV CODE 258: Performed by: OBSTETRICS & GYNECOLOGY

## 2018-10-04 PROCEDURE — 770002 HCHG ROOM/CARE - OB PRIVATE (112)

## 2018-10-04 PROCEDURE — 80053 COMPREHEN METABOLIC PANEL: CPT

## 2018-10-04 PROCEDURE — 700111 HCHG RX REV CODE 636 W/ 250 OVERRIDE (IP)

## 2018-10-04 PROCEDURE — 84550 ASSAY OF BLOOD/URIC ACID: CPT

## 2018-10-04 RX ORDER — SODIUM CHLORIDE, SODIUM LACTATE, POTASSIUM CHLORIDE, AND CALCIUM CHLORIDE .6; .31; .03; .02 G/100ML; G/100ML; G/100ML; G/100ML
1000 INJECTION, SOLUTION INTRAVENOUS
Status: COMPLETED | OUTPATIENT
Start: 2018-10-04 | End: 2018-10-04

## 2018-10-04 RX ORDER — SODIUM CHLORIDE, SODIUM LACTATE, POTASSIUM CHLORIDE, CALCIUM CHLORIDE 600; 310; 30; 20 MG/100ML; MG/100ML; MG/100ML; MG/100ML
INJECTION, SOLUTION INTRAVENOUS
Status: COMPLETED
Start: 2018-10-04 | End: 2018-10-04

## 2018-10-04 RX ORDER — SODIUM CHLORIDE, SODIUM LACTATE, POTASSIUM CHLORIDE, AND CALCIUM CHLORIDE .6; .31; .03; .02 G/100ML; G/100ML; G/100ML; G/100ML
250 INJECTION, SOLUTION INTRAVENOUS PRN
Status: DISCONTINUED | OUTPATIENT
Start: 2018-10-04 | End: 2018-10-05 | Stop reason: HOSPADM

## 2018-10-04 RX ORDER — MISOPROSTOL 200 UG/1
800 TABLET ORAL
Status: DISCONTINUED | OUTPATIENT
Start: 2018-10-04 | End: 2018-10-05 | Stop reason: HOSPADM

## 2018-10-04 RX ORDER — SODIUM CHLORIDE, SODIUM LACTATE, POTASSIUM CHLORIDE, CALCIUM CHLORIDE 600; 310; 30; 20 MG/100ML; MG/100ML; MG/100ML; MG/100ML
INJECTION, SOLUTION INTRAVENOUS CONTINUOUS
Status: ACTIVE | OUTPATIENT
Start: 2018-10-04 | End: 2018-10-05

## 2018-10-04 RX ORDER — ROPIVACAINE HYDROCHLORIDE 2 MG/ML
INJECTION, SOLUTION EPIDURAL; INFILTRATION; PERINEURAL
Status: COMPLETED
Start: 2018-10-04 | End: 2018-10-04

## 2018-10-04 RX ORDER — ROPIVACAINE HYDROCHLORIDE 2 MG/ML
INJECTION, SOLUTION EPIDURAL; INFILTRATION; PERINEURAL CONTINUOUS
Status: DISCONTINUED | OUTPATIENT
Start: 2018-10-04 | End: 2018-10-06 | Stop reason: HOSPADM

## 2018-10-04 RX ORDER — BUPIVACAINE HYDROCHLORIDE 2.5 MG/ML
INJECTION, SOLUTION EPIDURAL; INFILTRATION; INTRACAUDAL
Status: DISCONTINUED
Start: 2018-10-04 | End: 2018-10-05 | Stop reason: HOSPADM

## 2018-10-04 RX ADMIN — ROPIVACAINE HYDROCHLORIDE: 2 INJECTION, SOLUTION EPIDURAL; INFILTRATION; PERINEURAL at 23:16

## 2018-10-04 RX ADMIN — SODIUM CHLORIDE, POTASSIUM CHLORIDE, SODIUM LACTATE AND CALCIUM CHLORIDE: 600; 310; 30; 20 INJECTION, SOLUTION INTRAVENOUS at 23:41

## 2018-10-04 RX ADMIN — SODIUM CHLORIDE, POTASSIUM CHLORIDE, SODIUM LACTATE AND CALCIUM CHLORIDE: 600; 310; 30; 20 INJECTION, SOLUTION INTRAVENOUS at 22:50

## 2018-10-04 RX ADMIN — SODIUM CHLORIDE, SODIUM LACTATE, POTASSIUM CHLORIDE, AND CALCIUM CHLORIDE 1000 ML: .6; .31; .03; .02 INJECTION, SOLUTION INTRAVENOUS at 22:15

## 2018-10-04 RX ADMIN — SODIUM CHLORIDE, POTASSIUM CHLORIDE, SODIUM LACTATE AND CALCIUM CHLORIDE 1000 ML: 600; 310; 30; 20 INJECTION, SOLUTION INTRAVENOUS at 22:15

## 2018-10-04 RX ADMIN — ROPIVACAINE HYDROCHLORIDE: 2 INJECTION, SOLUTION EPIDURAL; INFILTRATION at 23:16

## 2018-10-04 ASSESSMENT — PATIENT HEALTH QUESTIONNAIRE - PHQ9
SUM OF ALL RESPONSES TO PHQ9 QUESTIONS 1 AND 2: 0
1. LITTLE INTEREST OR PLEASURE IN DOING THINGS: NOT AT ALL
2. FEELING DOWN, DEPRESSED, IRRITABLE, OR HOPELESS: NOT AT ALL

## 2018-10-04 ASSESSMENT — LIFESTYLE VARIABLES: EVER_SMOKED: YES

## 2018-10-05 LAB
ALBUMIN SERPL BCP-MCNC: 3.2 G/DL (ref 3.2–4.9)
ALBUMIN/GLOB SERPL: 0.9 G/DL
ALP SERPL-CCNC: 112 U/L (ref 30–99)
ALT SERPL-CCNC: 7 U/L (ref 2–50)
ANION GAP SERPL CALC-SCNC: 9 MMOL/L (ref 0–11.9)
AST SERPL-CCNC: 9 U/L (ref 12–45)
BILIRUB SERPL-MCNC: 0.3 MG/DL (ref 0.1–1.5)
BUN SERPL-MCNC: 8 MG/DL (ref 8–22)
CALCIUM SERPL-MCNC: 9.2 MG/DL (ref 8.5–10.5)
CHLORIDE SERPL-SCNC: 109 MMOL/L (ref 96–112)
CO2 SERPL-SCNC: 19 MMOL/L (ref 20–33)
CREAT SERPL-MCNC: 0.66 MG/DL (ref 0.5–1.4)
ERYTHROCYTE [DISTWIDTH] IN BLOOD BY AUTOMATED COUNT: 44.9 FL (ref 35.9–50)
GLOBULIN SER CALC-MCNC: 3.5 G/DL (ref 1.9–3.5)
GLUCOSE SERPL-MCNC: 104 MG/DL (ref 65–99)
HCT VFR BLD AUTO: 33.2 % (ref 37–47)
HGB BLD-MCNC: 11.2 G/DL (ref 12–16)
MCH RBC QN AUTO: 30.9 PG (ref 27–33)
MCHC RBC AUTO-ENTMCNC: 33.7 G/DL (ref 33.6–35)
MCV RBC AUTO: 91.7 FL (ref 81.4–97.8)
PLATELET # BLD AUTO: 228 K/UL (ref 164–446)
PMV BLD AUTO: 10.1 FL (ref 9–12.9)
POTASSIUM SERPL-SCNC: 3.8 MMOL/L (ref 3.6–5.5)
PROT SERPL-MCNC: 6.7 G/DL (ref 6–8.2)
RBC # BLD AUTO: 3.62 M/UL (ref 4.2–5.4)
SODIUM SERPL-SCNC: 137 MMOL/L (ref 135–145)
URATE SERPL-MCNC: 5.4 MG/DL (ref 1.9–8.2)
WBC # BLD AUTO: 20.3 K/UL (ref 4.8–10.8)

## 2018-10-05 PROCEDURE — 700102 HCHG RX REV CODE 250 W/ 637 OVERRIDE(OP): Performed by: OBSTETRICS & GYNECOLOGY

## 2018-10-05 PROCEDURE — 85027 COMPLETE CBC AUTOMATED: CPT

## 2018-10-05 PROCEDURE — 700101 HCHG RX REV CODE 250

## 2018-10-05 PROCEDURE — 770002 HCHG ROOM/CARE - OB PRIVATE (112)

## 2018-10-05 PROCEDURE — 90471 IMMUNIZATION ADMIN: CPT

## 2018-10-05 PROCEDURE — 90686 IIV4 VACC NO PRSV 0.5 ML IM: CPT | Performed by: SPECIALIST

## 2018-10-05 PROCEDURE — 59409 OBSTETRICAL CARE: CPT

## 2018-10-05 PROCEDURE — 303615 HCHG EPIDURAL/SPINAL ANESTHESIA FOR LABOR

## 2018-10-05 PROCEDURE — A9270 NON-COVERED ITEM OR SERVICE: HCPCS | Performed by: OBSTETRICS & GYNECOLOGY

## 2018-10-05 PROCEDURE — 700111 HCHG RX REV CODE 636 W/ 250 OVERRIDE (IP): Performed by: SPECIALIST

## 2018-10-05 PROCEDURE — 36415 COLL VENOUS BLD VENIPUNCTURE: CPT

## 2018-10-05 PROCEDURE — 700111 HCHG RX REV CODE 636 W/ 250 OVERRIDE (IP): Performed by: OBSTETRICS & GYNECOLOGY

## 2018-10-05 PROCEDURE — 304965 HCHG RECOVERY SERVICES

## 2018-10-05 PROCEDURE — 3E02340 INTRODUCTION OF INFLUENZA VACCINE INTO MUSCLE, PERCUTANEOUS APPROACH: ICD-10-PCS | Performed by: SPECIALIST

## 2018-10-05 RX ORDER — OXYCODONE HYDROCHLORIDE AND ACETAMINOPHEN 5; 325 MG/1; MG/1
1 TABLET ORAL EVERY 4 HOURS PRN
Status: DISCONTINUED | OUTPATIENT
Start: 2018-10-05 | End: 2018-10-06 | Stop reason: HOSPADM

## 2018-10-05 RX ORDER — IBUPROFEN 600 MG/1
600 TABLET ORAL EVERY 6 HOURS PRN
Status: DISCONTINUED | OUTPATIENT
Start: 2018-10-05 | End: 2018-10-06 | Stop reason: HOSPADM

## 2018-10-05 RX ORDER — SODIUM CHLORIDE, SODIUM LACTATE, POTASSIUM CHLORIDE, CALCIUM CHLORIDE 600; 310; 30; 20 MG/100ML; MG/100ML; MG/100ML; MG/100ML
INJECTION, SOLUTION INTRAVENOUS PRN
Status: DISCONTINUED | OUTPATIENT
Start: 2018-10-05 | End: 2018-10-06 | Stop reason: HOSPADM

## 2018-10-05 RX ORDER — DOCUSATE SODIUM 100 MG/1
100 CAPSULE, LIQUID FILLED ORAL 2 TIMES DAILY PRN
Status: DISCONTINUED | OUTPATIENT
Start: 2018-10-05 | End: 2018-10-06 | Stop reason: HOSPADM

## 2018-10-05 RX ORDER — MISOPROSTOL 200 UG/1
800 TABLET ORAL
Status: DISCONTINUED | OUTPATIENT
Start: 2018-10-05 | End: 2018-10-06 | Stop reason: HOSPADM

## 2018-10-05 RX ORDER — ONDANSETRON 2 MG/ML
4 INJECTION INTRAMUSCULAR; INTRAVENOUS EVERY 6 HOURS PRN
Status: DISCONTINUED | OUTPATIENT
Start: 2018-10-05 | End: 2018-10-06 | Stop reason: HOSPADM

## 2018-10-05 RX ORDER — OXYCODONE HYDROCHLORIDE AND ACETAMINOPHEN 5; 325 MG/1; MG/1
2 TABLET ORAL EVERY 4 HOURS PRN
Status: DISCONTINUED | OUTPATIENT
Start: 2018-10-05 | End: 2018-10-06 | Stop reason: HOSPADM

## 2018-10-05 RX ORDER — LIDOCAINE HYDROCHLORIDE 10 MG/ML
INJECTION, SOLUTION INFILTRATION; PERINEURAL
Status: COMPLETED
Start: 2018-10-05 | End: 2018-10-05

## 2018-10-05 RX ORDER — ONDANSETRON 4 MG/1
4 TABLET, ORALLY DISINTEGRATING ORAL EVERY 6 HOURS PRN
Status: DISCONTINUED | OUTPATIENT
Start: 2018-10-05 | End: 2018-10-06 | Stop reason: HOSPADM

## 2018-10-05 RX ADMIN — OXYCODONE HYDROCHLORIDE AND ACETAMINOPHEN 2 TABLET: 5; 325 TABLET ORAL at 08:13

## 2018-10-05 RX ADMIN — LIDOCAINE HYDROCHLORIDE: 10 INJECTION, SOLUTION INFILTRATION; PERINEURAL at 04:46

## 2018-10-05 RX ADMIN — OXYCODONE HYDROCHLORIDE AND ACETAMINOPHEN 1 TABLET: 5; 325 TABLET ORAL at 14:22

## 2018-10-05 RX ADMIN — INFLUENZA A VIRUS A/MICHIGAN/45/2015 X-275 (H1N1) ANTIGEN (FORMALDEHYDE INACTIVATED), INFLUENZA A VIRUS A/SINGAPORE/INFIMH-16-0019/2016 IVR-186 (H3N2) ANTIGEN (FORMALDEHYDE INACTIVATED), INFLUENZA B VIRUS B/PHUKET/3073/2013 ANTIGEN (FORMALDEHYDE INACTIVATED), AND INFLUENZA B VIRUS B/MARYLAND/15/2016 BX-69A ANTIGEN (FORMALDEHYDE INACTIVATED) 0.5 ML: 15; 15; 15; 15 INJECTION, SUSPENSION INTRAMUSCULAR at 16:43

## 2018-10-05 RX ADMIN — IBUPROFEN 600 MG: 600 TABLET, FILM COATED ORAL at 21:12

## 2018-10-05 RX ADMIN — IBUPROFEN 600 MG: 600 TABLET, FILM COATED ORAL at 08:13

## 2018-10-05 RX ADMIN — Medication 125 ML/HR: at 05:26

## 2018-10-05 RX ADMIN — OXYCODONE HYDROCHLORIDE AND ACETAMINOPHEN 1 TABLET: 5; 325 TABLET ORAL at 18:36

## 2018-10-05 RX ADMIN — FENTANYL CITRATE 100 MCG: 50 INJECTION, SOLUTION INTRAMUSCULAR; INTRAVENOUS at 04:44

## 2018-10-05 RX ADMIN — Medication 2000 ML/HR: at 04:38

## 2018-10-05 RX ADMIN — IBUPROFEN 600 MG: 600 TABLET, FILM COATED ORAL at 14:22

## 2018-10-05 RX ADMIN — Medication 2 MILLI-UNITS/MIN: at 01:14

## 2018-10-05 RX ADMIN — OXYCODONE HYDROCHLORIDE AND ACETAMINOPHEN 2 TABLET: 5; 325 TABLET ORAL at 23:33

## 2018-10-05 ASSESSMENT — PAIN SCALES - GENERAL
PAINLEVEL_OUTOF10: 8
PAINLEVEL_OUTOF10: 6
PAINLEVEL_OUTOF10: 5
PAINLEVEL_OUTOF10: 6
PAINLEVEL_OUTOF10: 7
PAINLEVEL_OUTOF10: 0

## 2018-10-05 NOTE — CARE PLAN
Problem: Pain  Goal: Alleviation of Pain or a reduction in pain to the patient's comfort goal  Outcome: PROGRESSING AS EXPECTED  Pt states feeling comfortable and free from pain at this time with epidural in place. Will continue to assess.    Problem: Risk for Infection, Impaired Wound Healing  Goal: Remain free from signs and symptoms of infection  Outcome: PROGRESSING AS EXPECTED  Pt is afebrile and free from s/s of infection at this time. Will continue to assess.

## 2018-10-05 NOTE — PROGRESS NOTES
, EDC 10/16, GA 38w2d. Pt presents to L&D with c/o UCs growing in intensity since 1500. Pt denies LOF or VB and + Fetal movement. Pt escorted to labor room, oriented to room and unit, and external monitors applied. POC discussed with pt and s/o and encouraged to state needs or questions at any time.    2156 SVE by BONY Coello RN 6-7/100/-2    2205 Dr. Raina Israel called and given report, admission orders received    2256 Dr. Strong at bedside to place epidural, pt tolerated well.    2335 SVE by BONY Coello RN 7/100/-2    0000 Dr. Raina Israel called and given update.    0145 SROM clear fluid    0147 SVE by BONY Coello RN 8/100/-1    0158 Dr. Raina Israel called and given update    0357 Pt feels urge to push. SVE by BONY Coello RN C/+2. Pt started pushing, RN at bedside through delivery    0425 Dr. Raina Israel at bedside through delivery    0432  of viable female infant, apgars 8/9    0438 delivery of placenta S/I. Fundal rubs performed per protocol, fundus firm at umbilicus with light lochia rubra    0650 Pt up to bathroom and voided.     0700 Report given to JUAN Ibarra RN at bedside

## 2018-10-05 NOTE — PROGRESS NOTES
0700- report received from Kendell YANEZ  1205- pt transferred to room 331 by wheelchair, report to Gabriela YANEZ

## 2018-10-05 NOTE — PROGRESS NOTES
Patient arrived on unit at 0830 with infant and L&D RN. Assessment completed. Fundus firm; lochia light. Patient states pain 0/10;declines medication at this time. Patient oriented to room; verbalizes understanding. POC discussed. All questions and concerns addressed. Call light in within reach. Bed in lowest, locked position. Will continue to monitor.

## 2018-10-05 NOTE — LACTATION NOTE
This note was copied from a baby's chart.  Infant has never latched, hand expression is painful and uncomfortable for mother, areola are swollen and nipples flat. Initiated pumping/supplementation plan and encouraged methods to decrease areolar edema. See lactation flow sheet for more details.

## 2018-10-05 NOTE — CARE PLAN
Problem: Altered physiologic condition related to immediate post-delivery state and potential for bleeding/hemorrhage  Goal: Patient physiologically stable as evidenced by normal lochia, palpable uterine involution and vital signs within normal limits  Outcome: PROGRESSING AS EXPECTED  VSS. Fundus firm, lochia light.    Problem: Potential for postpartum infection related to presence of episiotomy/vaginal tear and/or uterine contamination  Goal: Patient will be absent from signs and symptoms of infection  Outcome: PROGRESSING AS EXPECTED  Pt. Afebrile, VSS. No s/s of infection

## 2018-10-05 NOTE — L&D DELIVERY NOTE
DELIVERY NOTE:    Admitting Diagnosis:  33 year old  38.2 weeks active labor.   She presented to L&D at approximately 2200 with cervix of 6-7/90 and bulging BOW  GBS negative  She SROM'd at 0145 - clear AF  Pitocin augmentation was administered  She progressed uneventfully in labor and delivered via  at 0432  to a LBF in OA presentation with BW 3305 (7 lb 4.6 oz)  AS 8/9.  Good suctioning of the nose and mouth was done, cord clamped and cut by FOB and baby placed skin to skin with RN in attendance for care.   (+) terminal meconium noted  Patient sustained a small perineal abrasion 1st degree which was repaired using chromic 3-0 in a normal usual sterile fashion under local anesthesia.  Placenta was delivered spontaneously at 0438 complete and intact with 3 vessel cord.   Estimated Blood loss- 300 cc  Uterus noted to be firm and contracted immediately postpartum.  No other cervical nor vaginal lacerations noted. Note of multiple condyloma lesions on her labia and perianal areas  Patient tolerated the procedure well. No complications encountered.  Both patient and baby are in good condition.

## 2018-10-06 VITALS
TEMPERATURE: 97.7 F | RESPIRATION RATE: 20 BRPM | OXYGEN SATURATION: 98 % | HEIGHT: 68 IN | HEART RATE: 75 BPM | SYSTOLIC BLOOD PRESSURE: 144 MMHG | WEIGHT: 217 LBS | DIASTOLIC BLOOD PRESSURE: 89 MMHG | BODY MASS INDEX: 32.89 KG/M2

## 2018-10-06 PROCEDURE — 700102 HCHG RX REV CODE 250 W/ 637 OVERRIDE(OP): Performed by: OBSTETRICS & GYNECOLOGY

## 2018-10-06 PROCEDURE — 700112 HCHG RX REV CODE 229: Performed by: OBSTETRICS & GYNECOLOGY

## 2018-10-06 PROCEDURE — A9270 NON-COVERED ITEM OR SERVICE: HCPCS | Performed by: OBSTETRICS & GYNECOLOGY

## 2018-10-06 RX ADMIN — IBUPROFEN 600 MG: 600 TABLET, FILM COATED ORAL at 05:10

## 2018-10-06 RX ADMIN — IBUPROFEN 600 MG: 600 TABLET, FILM COATED ORAL at 11:25

## 2018-10-06 RX ADMIN — OXYCODONE HYDROCHLORIDE AND ACETAMINOPHEN 2 TABLET: 5; 325 TABLET ORAL at 05:10

## 2018-10-06 RX ADMIN — DOCUSATE SODIUM 100 MG: 100 CAPSULE, LIQUID FILLED ORAL at 05:10

## 2018-10-06 ASSESSMENT — PAIN SCALES - GENERAL
PAINLEVEL_OUTOF10: 2
PAINLEVEL_OUTOF10: 1
PAINLEVEL_OUTOF10: 7
PAINLEVEL_OUTOF10: 1
PAINLEVEL_OUTOF10: 5
PAINLEVEL_OUTOF10: 2

## 2018-10-06 NOTE — DISCHARGE PLANNING
Discharge Planning Assessment Post Partum    Reason for Referral: MOB history of THC use  [address]  Type of Living Situation: home. MOB, FOB, FOB's parents and infant  Mom Diagnosis: post partum  Baby Diagnosis:   Primary Language: english    Name of Baby: Barbara Jordan  Father of the Baby: Maurilio Jordan  Involved in baby’s care? Yes. Lives in the home  Contact Information: [phone]    Prenatal Care: yes  Mom's [facility]CP: [facility]  PCP for new baby: [facility]    Support System: FOB family, MOB family  Coping/Bonding between mother & baby: bonding and doing appropriate infant care per RN  Source of Feeding: breast/formula - breast feeding not going well. Mother has had support from lactation.   Supplies for Infant: well prepared. Discussed safe sleep and car seat usage.     Mom's Insurance: Medicaid  Baby Covered on Insurance: yes  Mother Employed/School: none  Other children in the home/names & ages: none    Financial Hardship/Income: no.    Mom's Mental status: alert and oriented. Engaged. Good eye contact.  Services used prior to admit:     CPS History: first baby  Psychiatric History: denies  Domestic Violence History: denies  Drug/ETOH History: THC - used marijuana to assist with coming off prescribed pain medication. MD aware. Mother smoked last 2-3 weeks ago. Use intermittent. Infant UDS negative. Mother understands risk using marijuana and breast feeding. Plans to no longer use.     Resources Provided: None needed  Referrals Made: none needed     Clearance for Discharge: cleared to discharge to parents.

## 2018-10-06 NOTE — PROGRESS NOTES
2112 Pt doing well bonding with baby, Assessment done complained of mild  abdominal pain medicated her as per doctor orders, Needs attended.

## 2018-10-06 NOTE — PROGRESS NOTES
Patient off unit with infant and FOB and hospital escort at 1322 in self-ambulating. Discharge instructions reviewed and signed; copy given to patient and placed in chart. F/U appointment discussed. Patient verbalizes understanding.

## 2018-10-06 NOTE — LACTATION NOTE
This note was copied from a baby's chart.  Follow-up visit. ALLEN states she feels frustrated with breastfeeding process. Provided her reassurance that we would support her feeding goals. She did not want to try to latch infant at this feeding because she states her nipples are sore from using the pump incorrectly. She previously used the pump with 50% suction.  We reviewed the settings as follows:  80 CPMS for 2 minutes, then decrease to 60 CPMS. Suction is to her comfort- 20-30%, she says she will try starting at 20% next time she pumps, and to pump for 15 minutes at a time.    Areolar edema still present, and on palpation, ALLEN feels tenderness and soreness with hand expression.     Encouraged to attempt to latch if possible with next feeding, and to call for assistance as needed.    Reviewed outpatient resources available to her via [facility], and provided [facility] number for her to call.

## 2018-10-06 NOTE — DISCHARGE SUMMARY
Discharge Summary:      Ally Dickerson      Admit Date:   10/4/2018  Discharge Date:  10/6/2018     Admitting diagnosis:  Pregnancy  Indication for care in labor or delivery  Discharge Diagnosis: Status post vaginal, spontaneous.  Pregnancy Complications: none  Tubal Ligation:  no        History:  Past Medical History:   Diagnosis Date   • Anxiety    • Bell's palsy    • Condyloma of female genitalia    • Depression    • Endometriosis of other specified sites    • Other specified symptom associated with female genital organs     endometriosis; polyscysic syndrome; HPV   • Pain 2018    chronic pelvic pain, back, legs, 7/10   • Polycystic ovarian disease      OB History    Para Term  AB Living   1 1       1   SAB TAB Ectopic Molar Multiple Live Births           0 1      # Outcome Date GA Lbr Zeferino/2nd Weight Sex Delivery Anes PTL Lv   1 Para 10/05/18  06:57 / 00:35 3.305 kg (7 lb 4.6 oz) F Vag-Spont EPI, Local N MARIN           Patient has no known allergies.  Patient Active Problem List    Diagnosis Date Noted   • Female genital symptoms 2015   • Smoker 2013   • R Facial droop 2013   • Neurological deficit present 2013        Hospital Course:   33 y.o. , now para 1, was admitted with the above mentioned diagnosis, underwent Active Labor, vaginal, spontaneous. Patient postpartum course was unremarkable, with progressive advancement in diet , ambulation and toleration of oral analgesia. Patient without complaints today and desires discharge.      Vitals:    10/05/18 0835 10/05/18 1200 10/05/18 1700 10/05/18 2000   BP: 135/86 121/80 138/87 134/92   Pulse: 87 79 71 (!) 102   Resp:    Temp: 36.7 °C (98.1 °F) 36.9 °C (98.4 °F) 37 °C (98.6 °F) 36.7 °C (98.1 °F)   TempSrc:       SpO2: 100% 95% 96% 98%   Weight:       Height:           Current Facility-Administered Medications   Medication Dose   • ondansetron (ZOFRAN ODT) dispertab 4 mg  4 mg    Or   • ondansetron  (ZOFRAN) syringe/vial injection 4 mg  4 mg   • oxytocin (PITOCIN) infusion (for postpartum)   mL/hr   • ibuprofen (MOTRIN) tablet 600 mg  600 mg   • oxyCODONE-acetaminophen (PERCOCET) 5-325 MG per tablet 1 Tab  1 Tab   • oxyCODONE-acetaminophen (PERCOCET) 5-325 MG per tablet 2 Tab  2 Tab   • LR infusion     • PRN oxytocin (PITOCIN) (20 Units/1000 mL) PRN for excessive uterine bleeding - See Admin Instr  125-999 mL/hr   • miSOPROStol (CYTOTEC) tablet 800 mcg  800 mcg   • docusate sodium (COLACE) capsule 100 mg  100 mg   • ropivacaine (NAROPIN) injection         Exam:  Breast Exam: negative  Abdomen: Abdomen soft, non-tender. BS normal. No masses,  No organomegaly  Fundus Non Tender: yes  Incision: none  Perineum: perineum intact  Extremity: extremities, peripheral pulses and reflexes normal     Labs:  Recent Labs      10/04/18   2210  10/05/18   1301   WBC  19.2*  20.3*   RBC  4.40  3.62*   HEMOGLOBIN  13.1  11.2*   HEMATOCRIT  39.4  33.2*   MCV  89.5  91.7   MCH  29.8  30.9   MCHC  33.2*  33.7   RDW  43.7  44.9   PLATELETCT  282  228   MPV  10.0  10.1        Activity:   Discharge to home  Pelvic Rest x 6 weeks    Assessment:  normal postpartum course  Discharge Assessment: No heavy bleeding or foul vaginal discharge      Follow up: Leisa in 6 weeks     Discharge Meds:   No current outpatient prescriptions on file.       Geetha Pichardo M.D.

## 2018-10-06 NOTE — DISCHARGE INSTRUCTIONS
POSTPARTUM DISCHARGE INSTRUCTIONS FOR MOM    YOB: 1985   Age: 33 y.o.               Admit Date: 10/4/2018     Discharge Date: 10/6/2018  Attending Doctor:  Dimitris Giles M.D.                  Allergies:  Patient has no known allergies.    Discharged to home by car. Discharged via wheelchair, hospital escort: Yes.  Special equipment needed: Not Applicable  Belongings with: Personal  Be sure to schedule a follow-up appointment with your primary care doctor or any specialists as instructed.     Discharge Plan:   Diet Plan: Discussed  Activity Level: Discussed  Confirmed Follow up Appointment: Patient to Call and Schedule Appointment  Confirmed Symptoms Management: Discussed  Medication Reconciliation Updated: Yes  Pneumococcal Vaccine Administered/Refused: Not given - Patient refused pneumococcal vaccine  Influenza Vaccine Indication: Indicated: 9 to 64 years of age  Influenza Vaccine Given - only chart on this line when given: Influenza Vaccine Given (See MAR)    REASONS TO CALL YOUR OBSTETRICIAN:  1.   Persistent fever or shaking chills (Temperature higher than 100.4)  2.   Heavy bleeding (soaking more than 1 pad per hour); Passing clots  3.   Foul odor from vagina  4.   Mastitis (Breast infection; breast pain, chills, fever, redness)  5.   Urinary pain, burning or frequency  6.   Episiotomy infection  7.   Abdominal incision infection  8.   Severe depression longer than 24 hours    HAND WASHING  · Prior to handling the baby.  · Before breastfeeding or bottle feeding baby.  · After using the bathroom or changing the baby's diaper.    WOUND CARE  Ask your physician for additional care instructions.  In general:    ·  Incision:      · Keep clean and dry.    · Do NOT lift anything heavier than your baby for up to 6 weeks.    · There should not be any opening or pus.      VAGINAL CARE  · Nothing inside vagina for 6 weeks: no sexual intercourse, tampons or douching.  · Bleeding may continue for 2-4  "weeks.  Amount may vary.    · Call your physician for heavy bleeding which means soaking more than 1 pad per hour    BIRTH CONTROL  · It is possible to become pregnant at any time after delivery and while breastfeeding.  · Plan to discuss a method of birth control with your physician at your follow up visit. visit.    DIET AND ELIMINATION  · Eating more fiber (bran cereal, fruits, and vegetables) and drinking plenty of fluids will help to avoid constipation.  · Urinary frequency after childbirth is normal.    POSTPARTUM BLUES  During the first few days after birth, you may experience a sense of the \"blues\" which may include impatience, irritability or even crying.  These feeling come and go quickly.  However, as many as 1 in 10 women experience emotional symptoms known as postpartum depression.    Postpartum depression:  May start as early as the second or third day after delivery or take several weeks or months to develop.  Symptoms of \"blues\" are present, but are more intense:  Crying spells; loss of appetite; feelings of hopelessness or loss of control; fear of touching the baby; over concern or no concern at all about the baby; little or no concern about your own appearance/caring for yourself; and/or inability to sleep or excessive sleeping.  Contact your physician if you are experiencing any of these symptoms.    Crisis Hotline:  · [place] Crisis Hotline:  0-406-ARCBQOZ  Or 1-428.898.4402  · [place] Crisis Hotline:  1-765.784.3132  Or 509-214-1708    PREVENTING SHAKEN BABY:  If you are angry or stressed, PUT THE BABY IN THE CRIB, step away, take some deep breaths, and wait until you are calm to care for the baby.  DO NOT SHAKE THE BABY.  You are not alone, call a supporter for help.    · Crisis Call Center 24/7 crisis line 420-923-8060 or 1-822.343.6194  · You can also text them, text \"ANSWER\" to 484614    QUIT SMOKING/TOBACCO USE:  I understand the use of any tobacco products increases my chance of suffering " from future heart disease and could cause other illnesses which may shorten my life. Quitting the use of tobacco products is the single most important thing I can do to improve my health. For further information on smoking / tobacco cessation call a Toll Free Quit Line at 1-167.940.4773 (*National [employer] [place]) or 1-425.913.6404 (American Lung Association) or you can access the web based program at www.lungusa.org.    · [place] Tobacco Users Help Line:  (918) 547-2618       Toll Free: 1-242.166.1649  · Quit Tobacco Program [facility] (794)451-9403    DEPRESSION / SUICIDE RISK:  As you are discharged from this [facility], it is important to learn how to keep safe from harming yourself.    Recognize the warning signs:  · Abrupt changes in personality, positive or negative- including increase in energy   · Giving away possessions  · Change in eating patterns- significant weight changes-  positive or negative  · Change in sleeping patterns- unable to sleep or sleeping all the time   · Unwillingness or inability to communicate  · Depression  · Unusual sadness, discouragement and loneliness  · Talk of wanting to die  · Neglect of personal appearance   · Rebelliousness- reckless behavior  · Withdrawal from people/activities they love  · Confusion- inability to concentrate     If you or a loved one observes any of these behaviors or has concerns about self-harm, here's what you can do:  · Talk about it- your feelings and reasons for harming yourself  · Remove any means that you might use to hurt yourself (examples: pills, rope, extension cords, firearm)  · Get professional help from the community (Mental Health, Substance Abuse, psychological counseling)  · Do not be alone:Call your Safe Contact- someone whom you trust who will be there for you.  · Call your local CRISIS HOTLINE 150-3826 or 626-461-5938  · Call your local Children's Mobile Crisis Response Team [facility] (900)  778-4383 or www.VisitorsCafe  · Call the toll free National Suicide Prevention Hotlines   · National Suicide Prevention Lifeline 626-651-BBWB (5930)  · National Hope Line Network 800-SUICIDE (166-7970)    DISCHARGE SURVEY:  Thank you for choosing [facility].  We hope we provided you with very good care.  You may be receiving a survey in the mail.  Please fill it out.  Your opinion is valuable to us.    ADDITIONAL EDUCATIONAL MATERIALS GIVEN TO PATIENT:        My signature on this form indicates that:  1.  I have reviewed and understand the above information  2.  My questions regarding this information have been answered to my satisfaction.  3.  I have formulated a plan with my discharge nurse to obtain my prescribed medication for home.

## 2018-10-06 NOTE — PROGRESS NOTES
Assumed care of patient and assessment complete. Patient in stable condition, denies dizzines, VSS, fundus firm, lochia light, pain 1/10. Discussed plan of care for the day. Call light in reach, bed in lowest position, encouraged to call if assistance is needed.

## 2018-10-06 NOTE — LACTATION NOTE
"Follow-up visit, PCOS, this is mother's first baby. Mother reports pumping, but \"not getting anything out\" baby has been getting DBM. Mother currently breastfeeding/attemp, supplement DBM & pumping. Mother using pacifier baby very fussy, screaming. Assisted baby to breast, skin to skin, baby arching back, no latch. Discussed with mother instead of using pacifier try to latch baby at breast. Nipples assessed red sore, no breakdown noted. Mother has Medicaid, encouraged mother to sign-up with [facility] on Monday (can get pump through [facility]). Mother will be going home today, discussed renting pump through [employer] for 1 week, until able to get [place] pump- Mother agrees with plan, information sheet given. Reinforced breastfeeding when baby shows cues or by 3 hours from last feed, supplement after using pumped BM or formula then pump, every 2-3 hours. Mother has \"Supplemental guideline\" sheet understands to feed appropriate volumes per feeding.    Breastfeeding POC:  Breastfeed/attempt, supplement according to guideline volumes then pump, every 2-3 hours.          "

## 2019-01-23 NOTE — H&P
History and Physical      Ally Dickerson is a 33 y.o. female  at 38 weeks and 2 days presents with contractions  Subjective:   positive  For CTXS.   positive Feels pain   negative for LOF  negative for vaginal bleeding.   positive for fetal movement    ROS: A comprehensive review of systems was negative.    Past Medical History:   Diagnosis Date   • Anxiety    • Bell's palsy    • Condyloma of female genitalia    • Depression    • Endometriosis of other specified sites    • Other specified symptom associated with female genital organs     endometriosis; polyscysic syndrome; HPV   • Pain 2018    chronic pelvic pain, back, legs, 7/10   • Polycystic ovarian disease      Past Surgical History:   Procedure Laterality Date   • PELVISCOPY  2015    Procedure: PELVISCOPY CHRONOPERTUBATION - CRYOCAUTERY OF CERVIX;  Surgeon: Dimitris Giles M.D.;  Location: SURGERY [facility];  Service:    • DENTAL EXTRACTION(S)  2013    wisdom teeth     OB History    Para Term  AB Living   1             SAB TAB Ectopic Molar Multiple Live Births                    # Outcome Date GA Lbr Zeferino/2nd Weight Sex Delivery Anes PTL Lv   1 Current                 Social History     Social History   • Marital status: Single     Spouse name: N/A   • Number of children: N/A   • Years of education: N/A     Occupational History   • Not on file.     Social History Main Topics   • Smoking status: Current Every Day Smoker     Packs/day: 1.00     Types: Cigarettes   • Smokeless tobacco: Never Used   • Alcohol use No   • Drug use: Yes     Types: Marijuana, Inhaled      Comment: marijuana   • Sexual activity: Not on file     Other Topics Concern   • Not on file     Social History Narrative   • No narrative on file     Allergies: Patient has no known allergies.    Current Facility-Administered Medications:   •  ondansetron (ZOFRAN ODT) dispertab 4 mg, 4 mg, Oral, Q6HRS PRN **OR** ondansetron (ZOFRAN) syringe/vial injection  4 mg, 4 mg, Intravenous, Q6HRS PRN, Lindy Tay M.D.  •  oxytocin (PITOCIN) infusion (for postpartum), 2,000 mL/hr, Intravenous, Once **FOLLOWED BY** oxytocin (PITOCIN) infusion (for postpartum),  mL/hr, Intravenous, Continuous, Lindy Tay M.D.  •  ibuprofen (MOTRIN) tablet 600 mg, 600 mg, Oral, Q6HRS PRN, Lindy Tay M.D.  •  oxyCODONE-acetaminophen (PERCOCET) 5-325 MG per tablet 1 Tab, 1 Tab, Oral, Q4HRS PRN, Lindy Tay M.D.  •  oxyCODONE-acetaminophen (PERCOCET) 5-325 MG per tablet 2 Tab, 2 Tab, Oral, Q4HRS PRN, Lindy Tay M.D.  •  oxytocin (PITOCIN) 20 UNITS/1000ML LR (induction of labor), 0.5-20 lissa-units/min, Intravenous, Continuous, Lindy Tay M.D., Stopped at 10/05/18 0438  •  LR infusion, , Intravenous, Continuous, Lindy Tay M.D., Last Rate: 125 mL/hr at 10/04/18 2341  •  fentaNYL (SUBLIMAZE) injection 50 mcg, 50 mcg, Intravenous, Q HOUR PRN, Lindy Tay M.D.  •  fentaNYL (SUBLIMAZE) injection 100 mcg, 100 mcg, Intravenous, Q HOUR PRN, Lindy Tay M.D., 100 mcg at 10/05/18 0444  •  miSOPROStol (CYTOTEC) tablet 800 mcg, 800 mcg, Rectal, Once PRN, Lindy Tay M.D.  •  ropivacaine (NAROPIN) injection, , Epidural, Continuous, Montrell Strong M.D.  •  ePHEDrine injection 5 mg, 5 mg, Intravenous, Q5 MIN PRN **AND** Notify Anesthesiologist if ephedrine given and for sustained hypotension (more than 2 minutes), , , Once **AND** For Hypotension: Place patient in left lateral tilt to achieve uterine displacement and elevate legs., , , PRN **AND** Hypotension: Oxygen Continuous, , , CONTINUOUS **AND** lactated ringers infusion (BOLUS), 250 mL, Intravenous, PRN, Montrell Strong M.D.  •  BUPIVACAINE HCL (PF) 0.25 % INJ SOLN, , , ,     Prenatal care with Dr Giles with the following problems:  Patient Active Problem  "List    Diagnosis Date Noted   • Female genital symptoms 06/13/2015   • Smoker 05/17/2013   • R Facial droop 05/16/2013   • Neurological deficit present 05/16/2013     Objective:      Blood pressure 123/78, pulse 96, temperature 37.2 °C (98.9 °F), temperature source Oral, height 1.727 m (5' 8\"), weight 98.4 kg (217 lb), last menstrual period 01/25/2018.    General:   no acute distress, alert, cooperative   Skin:   normal   HEENT:  Sclera clear, anicteric   Lungs:   CTA bilateral   Heart:   S1, S2 normal, no murmur, click, rub or gallop, regular rate and rhythm, no JVD, no hepatosplenomegaly   Abdomen:   gravid, NT   EFW:  5931-3446   Pelvis:  adequate with gynecoid pelvis; (+) labial and perineal condyloma lesions   FHT:  140 BPM   Uterine Size: S=D   Presentations: Cephalic   Cervix:     Dilation: 6-7    Effacement: 100%    Station:  0    Consistency: Soft    Position: Anterior     Lab Review  Lab:   Blood type:      Recent Results (from the past 5880 hour(s))   BMP    Collection Time: 02/16/18 11:33 AM   Result Value Ref Range    Sodium 133 (L) 135 - 145 mmol/L    Potassium 3.5 (L) 3.6 - 5.5 mmol/L    Chloride 102 96 - 112 mmol/L    Co2 23 20 - 33 mmol/L    Glucose 103 (H) 65 - 99 mg/dL    Bun 13 8 - 22 mg/dL    Creatinine 0.67 0.50 - 1.40 mg/dL    Calcium 10.5 8.5 - 10.5 mg/dL    Anion Gap 8.0 0.0 - 11.9   CBC WITHOUT DIFFERENTIAL    Collection Time: 02/16/18 11:33 AM   Result Value Ref Range    WBC 9.6 4.8 - 10.8 K/uL    RBC 5.23 4.20 - 5.40 M/uL    Hemoglobin 15.9 12.0 - 16.0 g/dL    Hematocrit 47.3 (H) 37.0 - 47.0 %    MCV 90.4 81.4 - 97.8 fL    MCH 30.4 27.0 - 33.0 pg    MCHC 33.6 33.6 - 35.0 g/dL    RDW 43.1 35.9 - 50.0 fL    Platelet Count 392 164 - 446 K/uL    MPV 9.5 9.0 - 12.9 fL   HCG,QUAL (OUTPATIENT ONLY)    Collection Time: 02/16/18 11:33 AM   Result Value Ref Range    Beta-Hcg Qualitative Serum Positive (A) Negative   ESTIMATED GFR    Collection Time: 02/16/18 11:33 AM   Result Value Ref Range    " GFR If African American >60 >60 mL/min/1.73 m 2    GFR If Non African American >60 >60 mL/min/1.73 m 2   HCG QUALITATIVE UR    Collection Time: 02/20/18  6:29 AM   Result Value Ref Range    Beta-Hcg Urine Positive (A) Negative   HCG QUANTITATIVE    Collection Time: 02/20/18  6:29 AM   Result Value Ref Range    Bhcg 9520.5 (H) 0.0 - 5.0 mIU/mL   REFRACTOMETER SG    Collection Time: 02/20/18  6:29 AM   Result Value Ref Range    Specific Gravity 1.032    CBC WITH DIFFERENTIAL    Collection Time: 09/25/18  5:05 PM   Result Value Ref Range    WBC 13.2 (H) 4.8 - 10.8 K/uL    RBC 4.40 4.20 - 5.40 M/uL    Hemoglobin 13.4 12.0 - 16.0 g/dL    Hematocrit 40.5 37.0 - 47.0 %    MCV 92.0 81.4 - 97.8 fL    MCH 30.5 27.0 - 33.0 pg    MCHC 33.1 (L) 33.6 - 35.0 g/dL    RDW 45.1 35.9 - 50.0 fL    Platelet Count 321 164 - 446 K/uL    MPV 10.3 9.0 - 12.9 fL    Neutrophils-Polys 72.60 (H) 44.00 - 72.00 %    Lymphocytes 18.70 (L) 22.00 - 41.00 %    Monocytes 7.10 0.00 - 13.40 %    Eosinophils 0.50 0.00 - 6.90 %    Basophils 0.30 0.00 - 1.80 %    Immature Granulocytes 0.80 0.00 - 0.90 %    Nucleated RBC 0.00 /100 WBC    Neutrophils (Absolute) 9.53 (H) 2.00 - 7.15 K/uL    Lymphs (Absolute) 2.46 1.00 - 4.80 K/uL    Monos (Absolute) 0.94 (H) 0.00 - 0.85 K/uL    Eos (Absolute) 0.07 0.00 - 0.51 K/uL    Baso (Absolute) 0.04 0.00 - 0.12 K/uL    Immature Granulocytes (abs) 0.11 0.00 - 0.11 K/uL    NRBC (Absolute) 0.00 K/uL   PROTEIN/CREAT RATIO URINE    Collection Time: 09/25/18  5:05 PM   Result Value Ref Range    Total Protein, Urine 18.6 (H) 0.0 - 15.0 mg/dL    Creatinine, Random Urine 151.30 mg/dL    Protein Creatinine Ratio 123 (H) 10 - 107 mg/g   POC UA    Collection Time: 09/25/18  5:12 PM   Result Value Ref Range    POC Color Dominga     POC Appearance Slightly Cloudy (A)     POC Glucose Negative Negative mg/dL    POC Ketones 15 (A) Negative mg/dL    POC Specific Gravity >=1.030 (A) 1.005 - 1.030    POC Blood Moderate (A) Negative    POC  Urine PH 5.5 5.0 - 8.0    POC Protein Trace (A) Negative mg/dL    POC Nitrites Negative Negative    POC Leukocyte Esterase Negative Negative   COMP METABOLIC PANEL    Collection Time: 09/25/18  5:34 PM   Result Value Ref Range    Sodium 137 135 - 145 mmol/L    Potassium 3.6 3.6 - 5.5 mmol/L    Chloride 104 96 - 112 mmol/L    Co2 22 20 - 33 mmol/L    Anion Gap 11.0 0.0 - 11.9    Glucose 94 65 - 99 mg/dL    Bun 8 8 - 22 mg/dL    Creatinine 0.69 0.50 - 1.40 mg/dL    Calcium 9.9 8.5 - 10.5 mg/dL    AST(SGOT) 14 12 - 45 U/L    ALT(SGPT) 10 2 - 50 U/L    Alkaline Phosphatase 101 (H) 30 - 99 U/L    Total Bilirubin 0.2 0.1 - 1.5 mg/dL    Albumin 3.7 3.2 - 4.9 g/dL    Total Protein 6.7 6.0 - 8.2 g/dL    Globulin 3.0 1.9 - 3.5 g/dL    A-G Ratio 1.2 g/dL   URIC ACID    Collection Time: 09/25/18  5:34 PM   Result Value Ref Range    Uric Acid 5.2 1.9 - 8.2 mg/dL   ESTIMATED GFR    Collection Time: 09/25/18  5:34 PM   Result Value Ref Range    GFR If African American >60 >60 mL/min/1.73 m 2    GFR If Non African American >60 >60 mL/min/1.73 m 2   Hold Blood Bank Specimen (Not Tested)    Collection Time: 10/04/18 10:10 PM   Result Value Ref Range    Holding Tube - Bb DONE    CBC WITH DIFFERENTIAL    Collection Time: 10/04/18 10:10 PM   Result Value Ref Range    WBC 19.2 (H) 4.8 - 10.8 K/uL    RBC 4.40 4.20 - 5.40 M/uL    Hemoglobin 13.1 12.0 - 16.0 g/dL    Hematocrit 39.4 37.0 - 47.0 %    MCV 89.5 81.4 - 97.8 fL    MCH 29.8 27.0 - 33.0 pg    MCHC 33.2 (L) 33.6 - 35.0 g/dL    RDW 43.7 35.9 - 50.0 fL    Platelet Count 282 164 - 446 K/uL    MPV 10.0 9.0 - 12.9 fL    Neutrophils-Polys 81.10 (H) 44.00 - 72.00 %    Lymphocytes 11.40 (L) 22.00 - 41.00 %    Monocytes 6.10 0.00 - 13.40 %    Eosinophils 0.30 0.00 - 6.90 %    Basophils 0.40 0.00 - 1.80 %    Immature Granulocytes 0.70 0.00 - 0.90 %    Nucleated RBC 0.00 /100 WBC    Neutrophils (Absolute) 15.63 (H) 2.00 - 7.15 K/uL    Lymphs (Absolute) 2.19 1.00 - 4.80 K/uL    Monos (Absolute)  1.17 (H) 0.00 - 0.85 K/uL    Eos (Absolute) 0.05 0.00 - 0.51 K/uL    Baso (Absolute) 0.07 0.00 - 0.12 K/uL    Immature Granulocytes (abs) 0.13 (H) 0.00 - 0.11 K/uL    NRBC (Absolute) 0.00 K/uL   COMP METABOLIC PANEL    Collection Time: 10/04/18 10:10 PM   Result Value Ref Range    Sodium 137 135 - 145 mmol/L    Potassium 3.8 3.6 - 5.5 mmol/L    Chloride 109 96 - 112 mmol/L    Co2 19 (L) 20 - 33 mmol/L    Anion Gap 9.0 0.0 - 11.9    Glucose 104 (H) 65 - 99 mg/dL    Bun 8 8 - 22 mg/dL    Creatinine 0.66 0.50 - 1.40 mg/dL    Calcium 9.2 8.5 - 10.5 mg/dL    AST(SGOT) 9 (L) 12 - 45 U/L    ALT(SGPT) 7 2 - 50 U/L    Alkaline Phosphatase 112 (H) 30 - 99 U/L    Total Bilirubin 0.3 0.1 - 1.5 mg/dL    Albumin 3.2 3.2 - 4.9 g/dL    Total Protein 6.7 6.0 - 8.2 g/dL    Globulin 3.5 1.9 - 3.5 g/dL    A-G Ratio 0.9 g/dL   URIC ACID    Collection Time: 10/04/18 10:10 PM   Result Value Ref Range    Uric Acid 5.4 1.9 - 8.2 mg/dL   ESTIMATED GFR    Collection Time: 10/04/18 10:10 PM   Result Value Ref Range    GFR If African American >60 >60 mL/min/1.73 m 2    GFR If Non African American >60 >60 mL/min/1.73 m 2        Assessment:   Ally Dickerson is a 33 year old  38 weeks and 2 days   Labor status: Active phase labor.  Obstetrical history significant for   Patient Active Problem List    Diagnosis Date Noted   • Female genital symptoms 2015   • Smoker 2013   • R Facial droop 2013   • Neurological deficit present 2013   .      Plan:     Admit to L&D  GBS negative  Pitocin for augmentation PRN  Epidural for pain mgt  Anticipate               no

## 2020-07-02 NOTE — ED NOTES
.  Chief Complaint   Patient presents with   • RLQ Pain     hx polycystic ovary disease, pain x 2 days   • N/V   • Chills     Ambulated to triage. Reports increase in pain last night, felt as if maybe it burst.    Yes

## 2020-09-08 ENCOUNTER — APPOINTMENT (OUTPATIENT)
Dept: RADIOLOGY | Facility: MEDICAL CENTER | Age: 35
End: 2020-09-08
Attending: EMERGENCY MEDICINE
Payer: MEDICAID

## 2020-09-08 ENCOUNTER — HOSPITAL ENCOUNTER (EMERGENCY)
Facility: MEDICAL CENTER | Age: 35
End: 2020-09-08
Attending: EMERGENCY MEDICINE
Payer: MEDICAID

## 2020-09-08 VITALS
DIASTOLIC BLOOD PRESSURE: 98 MMHG | RESPIRATION RATE: 18 BRPM | OXYGEN SATURATION: 100 % | TEMPERATURE: 97.8 F | HEIGHT: 68 IN | SYSTOLIC BLOOD PRESSURE: 148 MMHG | HEART RATE: 98 BPM | WEIGHT: 188.27 LBS | BODY MASS INDEX: 28.53 KG/M2

## 2020-09-08 DIAGNOSIS — M25.462 EFFUSION OF LEFT KNEE JOINT: ICD-10-CM

## 2020-09-08 DIAGNOSIS — M25.562 ACUTE PAIN OF LEFT KNEE: ICD-10-CM

## 2020-09-08 PROCEDURE — 73564 X-RAY EXAM KNEE 4 OR MORE: CPT | Mod: LT

## 2020-09-08 PROCEDURE — 99283 EMERGENCY DEPT VISIT LOW MDM: CPT

## 2020-09-08 RX ORDER — ONDANSETRON 4 MG/1
TABLET, ORALLY DISINTEGRATING ORAL
Status: DISCONTINUED
Start: 2020-09-08 | End: 2020-09-09 | Stop reason: HOSPADM

## 2020-09-08 ASSESSMENT — LIFESTYLE VARIABLES: DO YOU DRINK ALCOHOL: NO

## 2020-09-08 ASSESSMENT — FIBROSIS 4 INDEX: FIB4 SCORE: 0.55

## 2020-09-09 NOTE — ED PROVIDER NOTES
"ED Provider Note    CHIEF COMPLAINT  Chief Complaint   Patient presents with   • Knee Pain     walking 6 days ago then felt a \"pop\" and immediately felt severe pain \"inside my knee\". The patient has been treated with RICE method, Motrin, Tylenol. Unable to get into primary for 3 weeks.       HPI  Ally Dickerson is a 35 y.o. female who presents with complaint of left knee pain.  The patient states 6 days ago she was walking and suddenly felt a pop in her middle knee that was sharp in nature.  Since that time she has had severe pain to the medial aspect of her knee in the lateral aspect, every time she takes a step it hurts, she feels like her leg is flopping around.  Denies hip pain, ankle pain, loss of sensation or strength of her lower extremities.  Denies swelling of the calf but she has had swelling of the knee and feels like she has a joint effusion.  She has not had pain or intervention to her knee before.  She has been utilizing ibuprofen, Tylenol, cold and heat for pain control.  REVIEW OF SYSTEMS  Pertinent positives include pain to left knee, swelling of the left knee   pertinent negatives include loss of sensation or strength to lower extremity    PAST MEDICAL HISTORY  Past Medical History:   Diagnosis Date   • Anxiety    • Bell's palsy    • Condyloma of female genitalia    • Depression    • Endometriosis of other specified sites    • Other specified symptom associated with female genital organs     endometriosis; polyscysic syndrome; HPV   • Pain 02/16/2018    chronic pelvic pain, back, legs, 7/10   • Polycystic ovarian disease        FAMILY HISTORY  Family History   Problem Relation Age of Onset   • Cancer Unknown        SOCIAL HISTORY  Social History     Socioeconomic History   • Marital status: Single     Spouse name: Not on file   • Number of children: Not on file   • Years of education: Not on file   • Highest education level: Not on file   Occupational History   • Not on file   Social Needs " "  • Financial resource strain: Not on file   • Food insecurity     Worry: Not on file     Inability: Not on file   • Transportation needs     Medical: Not on file     Non-medical: Not on file   Tobacco Use   • Smoking status: Current Every Day Smoker     Packs/day: 1.00     Types: Cigarettes   • Smokeless tobacco: Never Used   Substance and Sexual Activity   • Alcohol use: No   • Drug use: Yes     Types: Marijuana, Inhaled     Comment: marijuana   • Sexual activity: Not on file   Lifestyle   • Physical activity     Days per week: Not on file     Minutes per session: Not on file   • Stress: Not on file   Relationships   • Social connections     Talks on phone: Not on file     Gets together: Not on file     Attends Denominational service: Not on file     Active member of club or organization: Not on file     Attends meetings of clubs or organizations: Not on file     Relationship status: Not on file   • Intimate partner violence     Fear of current or ex partner: Not on file     Emotionally abused: Not on file     Physically abused: Not on file     Forced sexual activity: Not on file   Other Topics Concern   • Not on file   Social History Narrative   • Not on file       SURGICAL HISTORY  Past Surgical History:   Procedure Laterality Date   • PELVISCOPY  6/13/2015    Procedure: PELVISCOPY CHRONOPERTUBATION - CRYOCAUTERY OF CERVIX;  Surgeon: Dimitris Giles M.D.;  Location: SURGERY [facility];  Service:    • DENTAL EXTRACTION(S)  2013    wisdom teeth       CURRENT MEDICATIONS  Home Medications     Reviewed by Maurilio Hernández R.N. (Registered Nurse) on 09/08/20 at 2052  Med List Status: Complete   Medication Last Dose Status   Melatonin 5 MG Tab  Active                ALLERGIES  No Known Allergies    PHYSICAL EXAM  VITAL SIGNS: /103   Pulse (!) 106   Temp 36 °C (96.8 °F) (Temporal)   Resp 18   Ht 1.727 m (5' 8\")   Wt 85.4 kg (188 lb 4.4 oz)   LMP 06/30/2020 Comment: Irregular  SpO2 100%   BMI 28.63 " kg/m²      Constitutional: Well developed, Well nourished, No acute distress, Non-toxic appearance.   Eyes: PERRLA, EOMI, Conjunctiva normal, No discharge.   Skin: Warm, Dry, No erythema, No rash.   Back: No midline thoracic and lumbar spine tenderness, No CVA tenderness.   Extremities: left knee joint effusion, no laxity on valgus varus, lateral medial stress, tenderness on all aspects of the exam, no Edema, distal pulses are brisk   neurologic: Sensation and strength intact left lower extremity      RADIOLOGY/PROCEDURES  DX-KNEE COMPLETE 4+ LEFT   Final Result         1.  Bony fragment or osteophyte at the lateral margin of the patella, otherwise no acute traumatic bony injury.            COURSE & MEDICAL DECISION MAKING  Pertinent Labs & Imaging studies reviewed. (See chart for details)  This is a pleasant 35-year-old female presents with knee pain sudden onset.  Patient has a lucency versus osteophyte by patella.  I do not suspect this is being a fracture more of an osteophyte.  I do believe the patient has internal derangement possible meniscus or ligamentous injury.  For this reason, she is instructed to utilize crutches as needed, ice, ibuprofen, Tylenol for pain and follow-up with the orthopedic surgeon.  She has no focal neurological deficit.  No vascular compromise.  No evidence of DVT on my clinical examination.    New Prescriptions    No medications on file         FINAL IMPRESSION     1. Acute pain of left knee Active   2. Effusion of left knee joint Active       DISPOSITION:  Patient will be discharged home in stable condition.    FOLLOW UP:  [facility], Emergency Dept  [address] [id number] [phone]    If symptoms worsen    Joao Mason M.D.  [address] [facility] [address]  [phone]          Electronically signed by: Noe Degroot D.O., 9/8/2020 10:10 PM

## 2020-09-09 NOTE — DISCHARGE INSTRUCTIONS
Please follow-up with Dr. Mason for further evaluation and management.  I do think you have internal derangement of your ligament structures are possible and discussed that we will need further evaluation and possible MRI.  For this reason follow-up with Dr. Mason.  We will provide you with crutches and utilize ice as well as ibuprofen or Tylenol for pain.  Return to emergency department you have severe pain, loss of sensation or strength lower extremity.

## 2020-09-09 NOTE — ED TRIAGE NOTES
"Chief Complaint   Patient presents with   • Knee Pain     walking 6 days ago then felt a \"pop\" and immediately felt severe pain \"inside my knee\". The patient has been treated with RICE method, Motrin, Tylenol. Unable to get into primary for 3 weeks.     Patient ambulatory with a limp in through triage for the above complaint with CMS intact. Triage process explained to patient and asked to update staff with any changes to her symptoms.   "

## 2021-10-25 ENCOUNTER — OFFICE VISIT (OUTPATIENT)
Dept: MEDICAL GROUP | Facility: CLINIC | Age: 36
End: 2021-10-25
Payer: MEDICAID

## 2021-10-25 VITALS
SYSTOLIC BLOOD PRESSURE: 128 MMHG | HEIGHT: 68 IN | HEART RATE: 94 BPM | DIASTOLIC BLOOD PRESSURE: 70 MMHG | WEIGHT: 192.4 LBS | BODY MASS INDEX: 29.16 KG/M2 | OXYGEN SATURATION: 97 %

## 2021-10-25 DIAGNOSIS — F17.200 TOBACCO DEPENDENCE SYNDROME: ICD-10-CM

## 2021-10-25 DIAGNOSIS — N80.9 ENDOMETRIOSIS: ICD-10-CM

## 2021-10-25 DIAGNOSIS — F41.8 MIXED ANXIETY DEPRESSIVE DISORDER: ICD-10-CM

## 2021-10-25 DIAGNOSIS — G51.0 BELL'S PALSY: ICD-10-CM

## 2021-10-25 PROCEDURE — 99214 OFFICE O/P EST MOD 30 MIN: CPT | Performed by: FAMILY MEDICINE

## 2021-10-25 RX ORDER — OXYCODONE AND ACETAMINOPHEN 10; 325 MG/1; MG/1
1 TABLET ORAL 3 TIMES DAILY
Qty: 90 TABLET | Refills: 0 | Status: SHIPPED | OUTPATIENT
Start: 2021-12-25 | End: 2022-01-19 | Stop reason: SDUPTHER

## 2021-10-25 RX ORDER — OXYCODONE AND ACETAMINOPHEN 10; 325 MG/1; MG/1
1 TABLET ORAL 3 TIMES DAILY
Qty: 90 TABLET | Refills: 0 | Status: SHIPPED | OUTPATIENT
Start: 2021-11-25 | End: 2021-12-25

## 2021-10-25 RX ORDER — OXYCODONE AND ACETAMINOPHEN 10; 325 MG/1; MG/1
1 TABLET ORAL 3 TIMES DAILY
COMMUNITY
Start: 2021-09-26 | End: 2021-10-25 | Stop reason: SDUPTHER

## 2021-10-25 RX ORDER — OXYCODONE AND ACETAMINOPHEN 10; 325 MG/1; MG/1
1 TABLET ORAL 3 TIMES DAILY
Qty: 90 TABLET | Refills: 0 | Status: SHIPPED | OUTPATIENT
Start: 2021-10-25 | End: 2021-11-25

## 2021-10-25 ASSESSMENT — PATIENT HEALTH QUESTIONNAIRE - PHQ9
SUM OF ALL RESPONSES TO PHQ QUESTIONS 1-9: 6
CLINICAL INTERPRETATION OF PHQ2 SCORE: 2
5. POOR APPETITE OR OVEREATING: 0 - NOT AT ALL

## 2021-10-25 NOTE — PROGRESS NOTES
"Subjective:     CC: medication refill    HPI:   Ally presents today with need for controlled substances refill.  Has chronic pelvic pain and been on Oxycodone 10mg tid for a number of years.  No increase in pain med needed.  Has no hx of depression being treated    Problem   Endometriosis    Chronic pelvic pain.  On oxycodone     Bell's Palsy    Never improved after Bell's palsy 10 years ago     Chronic Pain Syndrome   Mixed Anxiety Depressive Disorder    Doing much better and off meds     Tobacco Dependence Syndrome    Continues 1/2 ppd     Smoker (Resolved)       Current Outpatient Medications Ordered in Epic   Medication Sig Dispense Refill   • oxyCODONE-acetaminophen (PERCOCET-10)  MG Tab Take 1 Tablet by mouth in the morning, at noon, and at bedtime for 31 days. 90 Tablet 0   • [START ON 11/25/2021] oxyCODONE-acetaminophen (PERCOCET-10)  MG Tab Take 1 Tablet by mouth in the morning, at noon, and at bedtime for 30 days. 90 Tablet 0   • [START ON 12/25/2021] oxyCODONE-acetaminophen (PERCOCET-10)  MG Tab Take 1 Tablet by mouth in the morning, at noon, and at bedtime for 31 days. 90 Tablet 0   • Melatonin 5 MG Tab Take 1 Tab by mouth at bedtime as needed.       No current Epic-ordered facility-administered medications on file.         ROS:  Gen: no fevers/chills, no changes in weight  Eyes: no changes in vision  ENT: no sore throat, no hearing loss, no bloody nose  Pulm: no sob, no cough  CV: no chest pain, no palpitations  GI: no nausea/vomiting, no diarrhea  : no dysuria  MSk: no myalgias  Skin: no rash  Neuro: no headaches, no numbness/tingling  Heme/Lymph: no easy bruising      Objective:     Exam:  /70 (BP Location: Left arm, Patient Position: Sitting, BP Cuff Size: Adult)   Pulse 94   Ht 1.727 m (5' 8\")   Wt 87.3 kg (192 lb 6.4 oz)   SpO2 97%   BMI 29.25 kg/m²  Body mass index is 29.25 kg/m².    Gen: Alert and oriented, No apparent distress.  HEENT:  Facial paresis on " R  Neck: Neck is supple without lymphadenopathy.  Lungs: Normal effort, CTA bilaterally, no wheezes, rhonchi, or rales  CV: Regular rate and rhythm. No murmurs, rubs, or gallops.  Ext: No clubbing, cyanosis, edema.        Assessment & Plan:     36 y.o. female with the following -     Problem List Items Addressed This Visit     Bell's palsy     Continue to monitor         Endometriosis     reefilled oxycodone for 3 months.         Relevant Medications    oxyCODONE-acetaminophen (PERCOCET-10)  MG Tab    oxyCODONE-acetaminophen (PERCOCET-10)  MG Tab (Start on 11/25/2021)    oxyCODONE-acetaminophen (PERCOCET-10)  MG Tab (Start on 12/25/2021)    Other Relevant Orders    Controlled Substance Treatment Agreement    Mixed anxiety depressive disorder     No treatment for now         Tobacco dependence syndrome     Will continue to encourage to stop                   Return in about 3 months (around 1/25/2022) for Controlled substance refill.    Please note that this dictation was created using voice recognition software. I have made every reasonable attempt to correct obvious errors, but I expect that there are errors of grammar and possibly content that I did not discover before finalizing the note.

## 2021-10-27 ENCOUNTER — TELEPHONE (OUTPATIENT)
Dept: MEDICAL GROUP | Facility: CLINIC | Age: 36
End: 2021-10-27

## 2021-10-27 NOTE — TELEPHONE ENCOUNTER
Phone Number Called: [phone]    Call outcome: L/M for pt requesting call back.    Message: L/M for patient to come in and sign controlled substance form. We were unable to get the form to her on 10/25 due to Epic downtime.

## 2021-10-28 NOTE — TELEPHONE ENCOUNTER
Phone Number Called: [phone]    Call outcome: Did not leave a detailed message. Requested patient to call back.

## 2021-10-29 NOTE — TELEPHONE ENCOUNTER
VOICEMAIL  1. Caller Name: Ally Dickerson                      Call Back Number: [phone]    2. Message: Pt L/M letting me know she will come by to sign the paperwork needed. She was not able to come in earlier due to car issues. I informed pt that we will be closed 10/29    3. Patient approves office to leave a detailed voicemail/MyChart message: N\A

## 2022-01-19 ENCOUNTER — OFFICE VISIT (OUTPATIENT)
Dept: MEDICAL GROUP | Facility: CLINIC | Age: 37
End: 2022-01-19
Payer: MEDICAID

## 2022-01-19 VITALS
HEIGHT: 68 IN | BODY MASS INDEX: 29.1 KG/M2 | RESPIRATION RATE: 16 BRPM | WEIGHT: 192 LBS | DIASTOLIC BLOOD PRESSURE: 99 MMHG | SYSTOLIC BLOOD PRESSURE: 146 MMHG | HEART RATE: 79 BPM | OXYGEN SATURATION: 97 %

## 2022-01-19 DIAGNOSIS — F17.200 TOBACCO DEPENDENCE SYNDROME: ICD-10-CM

## 2022-01-19 DIAGNOSIS — G89.4 CHRONIC PAIN SYNDROME: ICD-10-CM

## 2022-01-19 DIAGNOSIS — N80.9 ENDOMETRIOSIS: ICD-10-CM

## 2022-01-19 DIAGNOSIS — G51.0 BELL'S PALSY: ICD-10-CM

## 2022-01-19 PROCEDURE — 99213 OFFICE O/P EST LOW 20 MIN: CPT | Performed by: FAMILY MEDICINE

## 2022-01-19 RX ORDER — OXYCODONE AND ACETAMINOPHEN 10; 325 MG/1; MG/1
1 TABLET ORAL 3 TIMES DAILY
Qty: 90 TABLET | Refills: 0 | Status: SHIPPED | OUTPATIENT
Start: 2022-02-19 | End: 2022-03-22

## 2022-01-19 RX ORDER — OXYCODONE AND ACETAMINOPHEN 10; 325 MG/1; MG/1
1 TABLET ORAL 3 TIMES DAILY
Qty: 90 TABLET | Refills: 0 | Status: SHIPPED | OUTPATIENT
Start: 2022-01-19 | End: 2022-02-19

## 2022-01-19 RX ORDER — OXYCODONE AND ACETAMINOPHEN 10; 325 MG/1; MG/1
1 TABLET ORAL 3 TIMES DAILY
Qty: 90 TABLET | Refills: 0 | Status: SHIPPED | OUTPATIENT
Start: 2022-03-22 | End: 2022-04-19 | Stop reason: SDUPTHER

## 2022-01-19 NOTE — PROGRESS NOTES
Subjective:     CC: Controlled substances refills    HPI:   Ally presents today with need for refills.  Ally has had no change in her pain management.  She takes 3 of the oxycodone per day.  She has no other red flags.  Reviewing her  appears to be normal I am the only person prescribing these medications.    Ally continues with endometrial pain.  She is still scheduled for having hysterectomy.  They are still waiting for resolution of the COVID pandemic before they schedule that.    Bell's palsy is the same no change.    Of note is her blood pressure has been running higher.  She has no chest pain or palpitations.  She is extremely anxious today with her 3-year-old running around the room.    Problem   Endometriosis    Chronic pelvic pain.  On oxycodone. Still about the same     Bell's Palsy    Never improved after Bell's palsy 10 years ago     Chronic Pain Syndrome    Needing refills.  Pain management stable     Tobacco Dependence Syndrome    Stopped January 1         Current Outpatient Medications Ordered in Epic   Medication Sig Dispense Refill   • oxyCODONE-acetaminophen (PERCOCET-10)  MG Tab Take 1 Tablet by mouth in the morning, at noon, and at bedtime for 31 days. 90 Tablet 0   • [START ON 2/19/2022] oxyCODONE-acetaminophen (PERCOCET-10)  MG Tab Take 1 Tablet by mouth in the morning, at noon, and at bedtime for 31 days. 90 Tablet 0   • [START ON 3/22/2022] oxyCODONE-acetaminophen (PERCOCET-10)  MG Tab Take 1 Tablet by mouth in the morning, at noon, and at bedtime for 31 days. 90 Tablet 0   • Melatonin 5 MG Tab Take 1 Tab by mouth at bedtime as needed.       No current Epic-ordered facility-administered medications on file.       Health Maintenance:     ROS:  Gen: no fevers/chills, no changes in weight  Eyes: no changes in vision  ENT: no sore throat, no hearing loss, no bloody nose  Pulm: no sob, no cough  CV: no chest pain, no palpitations  GI: no nausea/vomiting, no  "diarrhea  : no dysuria  MSk: no myalgias  Skin: no rash  Neuro: no headaches, no numbness/tingling  Heme/Lymph: no easy bruising      Objective:     Exam:  /99 (BP Location: Right arm, Patient Position: Sitting, BP Cuff Size: Adult)   Pulse 79   Resp 16   Ht 1.727 m (5' 8\")   Wt 87.1 kg (192 lb)   SpO2 97%   BMI 29.19 kg/m²  Body mass index is 29.19 kg/m².    Gen: Alert and oriented, No apparent distress.  HEENT: Facial asymmetry with drooping on the right.  Neck: Neck is supple without lymphadenopathy.  Lungs: Normal effort, CTA bilaterally, no wheezes, rhonchi, or rales  CV: Regular rate and rhythm. No murmurs, rubs, or gallops.  Ext: No clubbing, cyanosis, edema.      Labs: none    Assessment & Plan:     36 y.o. female with the following -     Problem List Items Addressed This Visit     Bell's palsy     No increase in symptoms         Chronic pain syndrome     Refill percocet for 3 months         Relevant Medications    oxyCODONE-acetaminophen (PERCOCET-10)  MG Tab    oxyCODONE-acetaminophen (PERCOCET-10)  MG Tab (Start on 2/19/2022)    oxyCODONE-acetaminophen (PERCOCET-10)  MG Tab (Start on 3/22/2022)    Endometriosis     Refill oxycodone         Relevant Medications    oxyCODONE-acetaminophen (PERCOCET-10)  MG Tab    oxyCODONE-acetaminophen (PERCOCET-10)  MG Tab (Start on 2/19/2022)    oxyCODONE-acetaminophen (PERCOCET-10)  MG Tab (Start on 3/22/2022)    Tobacco dependence syndrome     Continue off tobacco                   Return in about 3 months (around 4/19/2022) for medication management.    Please note that this dictation was created using voice recognition software. I have made every reasonable attempt to correct obvious errors, but I expect that there are errors of grammar and possibly content that I did not discover before finalizing the note.      "

## 2022-04-19 ENCOUNTER — OFFICE VISIT (OUTPATIENT)
Dept: MEDICAL GROUP | Facility: CLINIC | Age: 37
End: 2022-04-19
Payer: MEDICAID

## 2022-04-19 VITALS
OXYGEN SATURATION: 96 % | RESPIRATION RATE: 16 BRPM | SYSTOLIC BLOOD PRESSURE: 130 MMHG | WEIGHT: 183 LBS | BODY MASS INDEX: 27.74 KG/M2 | HEART RATE: 89 BPM | DIASTOLIC BLOOD PRESSURE: 93 MMHG | HEIGHT: 68 IN

## 2022-04-19 DIAGNOSIS — N80.9 ENDOMETRIOSIS: ICD-10-CM

## 2022-04-19 DIAGNOSIS — F17.200 TOBACCO DEPENDENCE SYNDROME: ICD-10-CM

## 2022-04-19 DIAGNOSIS — G89.4 CHRONIC PAIN SYNDROME: ICD-10-CM

## 2022-04-19 PROCEDURE — 99213 OFFICE O/P EST LOW 20 MIN: CPT | Performed by: FAMILY MEDICINE

## 2022-04-19 RX ORDER — OXYCODONE AND ACETAMINOPHEN 10; 325 MG/1; MG/1
1 TABLET ORAL 3 TIMES DAILY
Qty: 90 TABLET | Refills: 0 | Status: SHIPPED | OUTPATIENT
Start: 2022-05-20 | End: 2022-06-20

## 2022-04-19 RX ORDER — OXYCODONE AND ACETAMINOPHEN 10; 325 MG/1; MG/1
1 TABLET ORAL 3 TIMES DAILY
Qty: 90 TABLET | Refills: 0 | Status: SHIPPED | OUTPATIENT
Start: 2022-04-19 | End: 2022-05-20

## 2022-04-19 RX ORDER — OXYCODONE AND ACETAMINOPHEN 10; 325 MG/1; MG/1
1 TABLET ORAL 3 TIMES DAILY
Qty: 90 TABLET | Refills: 0 | Status: SHIPPED | OUTPATIENT
Start: 2022-06-20 | End: 2022-07-19 | Stop reason: SDUPTHER

## 2022-04-19 NOTE — PROGRESS NOTES
"Subjective:     CC: Controlled substances refill    HPI:   Ally presents today with a need for her controlled substances refilled.  Her pain management is about the same.  She has no red flags.  Her main complaint is that intense abdominal pain and facial pain.  The abdominal pain comes from her chronic endometriosis.  An official pain is status post Bell's palsy.    Problem   Endometriosis    Chronic pelvic pain.  On oxycodone. Still about the same     Chronic Pain Syndrome    Needing refills.  Pain management stable     Tobacco Dependence Syndrome    Stopped January 1, but restarted and not smoking as much         Current Outpatient Medications Ordered in Epic   Medication Sig Dispense Refill   • oxyCODONE-acetaminophen (PERCOCET-10)  MG Tab Take 1 Tablet by mouth in the morning, at noon, and at bedtime for 31 days. 90 Tablet 0   • [START ON 5/20/2022] oxyCODONE-acetaminophen (PERCOCET-10)  MG Tab Take 1 Tablet by mouth in the morning, at noon, and at bedtime for 31 days. 90 Tablet 0   • [START ON 6/20/2022] oxyCODONE-acetaminophen (PERCOCET-10)  MG Tab Take 1 Tablet by mouth in the morning, at noon, and at bedtime for 31 days. 90 Tablet 0   • Melatonin 5 MG Tab Take 1 Tab by mouth at bedtime as needed.       No current Epic-ordered facility-administered medications on file.       Health Maintenance:     ROS:  Gen: no fevers/chills, no changes in weight  Eyes: no changes in vision  ENT: no sore throat, no hearing loss, no bloody nose  Pulm: no sob, no cough  CV: no chest pain, no palpitations  GI: no nausea/vomiting, no diarrhea  : no dysuria  MSk: no myalgias  Skin: no rash  Neuro: no headaches, no numbness/tingling  Heme/Lymph: no easy bruising      Objective:     Exam:  /93 (BP Location: Right arm, Patient Position: Sitting, BP Cuff Size: Adult)   Pulse 89   Resp 16   Ht 1.727 m (5' 8\")   Wt 83 kg (183 lb)   SpO2 96%   BMI 27.83 kg/m²  Body mass index is 27.83 " kg/m².    Gen: Alert and oriented, No apparent distress.  Neck: Neck is supple without lymphadenopathy.  Lungs: Normal effort, CTA bilaterally, no wheezes, rhonchi, or rales  CV: Regular rate and rhythm. No murmurs, rubs, or gallops.  Ext: No clubbing, cyanosis, edema.      Labs: None    Assessment & Plan:     36 y.o. female with the following -     Problem List Items Addressed This Visit     Chronic pain syndrome     Refilled pain meds         Relevant Medications    oxyCODONE-acetaminophen (PERCOCET-10)  MG Tab    oxyCODONE-acetaminophen (PERCOCET-10)  MG Tab (Start on 5/20/2022)    oxyCODONE-acetaminophen (PERCOCET-10)  MG Tab (Start on 6/20/2022)    Endometriosis     No red flags  Meds refilled         Relevant Medications    oxyCODONE-acetaminophen (PERCOCET-10)  MG Tab    oxyCODONE-acetaminophen (PERCOCET-10)  MG Tab (Start on 5/20/2022)    oxyCODONE-acetaminophen (PERCOCET-10)  MG Tab (Start on 6/20/2022)    Tobacco dependence syndrome     Continue to assess..                   Return in about 3 months (around 7/19/2022) for controlled substances.    Please note that this dictation was created using voice recognition software. I have made every reasonable attempt to correct obvious errors, but I expect that there are errors of grammar and possibly content that I did not discover before finalizing the note.

## 2022-07-19 ENCOUNTER — OFFICE VISIT (OUTPATIENT)
Dept: MEDICAL GROUP | Facility: CLINIC | Age: 37
End: 2022-07-19
Payer: MEDICAID

## 2022-07-19 VITALS
OXYGEN SATURATION: 95 % | DIASTOLIC BLOOD PRESSURE: 82 MMHG | RESPIRATION RATE: 16 BRPM | HEART RATE: 90 BPM | WEIGHT: 177 LBS | SYSTOLIC BLOOD PRESSURE: 134 MMHG | BODY MASS INDEX: 26.83 KG/M2 | HEIGHT: 68 IN

## 2022-07-19 DIAGNOSIS — N80.9 ENDOMETRIOSIS: ICD-10-CM

## 2022-07-19 DIAGNOSIS — L98.9 SKIN LESION: ICD-10-CM

## 2022-07-19 DIAGNOSIS — G89.4 CHRONIC PAIN SYNDROME: ICD-10-CM

## 2022-07-19 PROCEDURE — 99214 OFFICE O/P EST MOD 30 MIN: CPT | Performed by: FAMILY MEDICINE

## 2022-07-19 RX ORDER — OXYCODONE AND ACETAMINOPHEN 10; 325 MG/1; MG/1
1 TABLET ORAL 3 TIMES DAILY
Qty: 90 TABLET | Refills: 0 | Status: SHIPPED | OUTPATIENT
Start: 2022-07-19 | End: 2022-08-17 | Stop reason: SDUPTHER

## 2022-07-19 RX ORDER — OXYCODONE AND ACETAMINOPHEN 10; 325 MG/1; MG/1
1 TABLET ORAL 3 TIMES DAILY
Qty: 90 TABLET | Refills: 0 | Status: SHIPPED | OUTPATIENT
Start: 2022-09-18 | End: 2022-10-18 | Stop reason: SDUPTHER

## 2022-07-19 RX ORDER — OXYCODONE AND ACETAMINOPHEN 10; 325 MG/1; MG/1
1 TABLET ORAL 3 TIMES DAILY
Qty: 90 TABLET | Refills: 0 | Status: SHIPPED | OUTPATIENT
Start: 2022-08-18 | End: 2022-09-17

## 2022-07-19 NOTE — PROGRESS NOTES
Subjective:     CC: Medication refill and suspicious skin lesion    HPI:   Ally presents today with a need for medication refill and she is also noticed a suspicious skin lesion.  She has had a longstanding history of chronic fibromyalgia and been on oxycodone for multiple years.  She states that her pain control is adequate with this current dose that she is taking 10 mg of oxycodone 3 times a day.  She is asking for refills.  No other red flags    She also noticed a skin lesion in her pubic hair area.  This was relatively small until she was pregnant and it grew quite a bit since pregnancy.  Now it seems to get irritated quite a bit and bleeds occasionally.  She is worried about skin cancer.  It does cause a fair amount of discomfort especially during sexual relationships.    Problem   Skin Lesion    Had small mole in vaginal area.  Checked by GYN and told it was ok.  Used podofilyn and went away.  Now changed to different color.  Gets irritated.  Especially with sex.       Chronic Pain Syndrome    Needing refills.  Pain management stable         Current Outpatient Medications Ordered in Epic   Medication Sig Dispense Refill   • oxyCODONE-acetaminophen (PERCOCET-10)  MG Tab Take 1 Tablet by mouth in the morning, at noon, and at bedtime for 30 days. 90 Tablet 0   • [START ON 8/18/2022] oxyCODONE-acetaminophen (PERCOCET-10)  MG Tab Take 1 Tablet by mouth in the morning, at noon, and at bedtime for 30 days. 90 Tablet 0   • [START ON 9/18/2022] oxyCODONE-acetaminophen (PERCOCET-10)  MG Tab Take 1 Tablet by mouth in the morning, at noon, and at bedtime for 30 days. 90 Tablet 0   • Melatonin 5 MG Tab Take 1 Tab by mouth at bedtime as needed.       No current Epic-ordered facility-administered medications on file.       Health Maintenance:     ROS:  Gen: no fevers/chills, no changes in weight  Eyes: no changes in vision  ENT: no sore throat, no hearing loss, no bloody nose  Pulm: no sob, no  "cough  CV: no chest pain, no palpitations  GI: no nausea/vomiting, no diarrhea  : no dysuria  MSk: no myalgias  Skin: no rash  Neuro: no headaches, no numbness/tingling  Heme/Lymph: no easy bruising      Objective:     Exam:  /82 (BP Location: Right arm, Patient Position: Sitting, BP Cuff Size: Adult)   Pulse 90   Resp 16   Ht 1.727 m (5' 8\")   Wt 80.3 kg (177 lb)   SpO2 95%   BMI 26.91 kg/m²  Body mass index is 26.91 kg/m².    Gen: Alert and oriented, No apparent distress.  Neck: Neck is supple without lymphadenopathy.  Lungs: Normal effort, CTA bilaterally, no wheezes, rhonchi, or rales  CV: Regular rate and rhythm. No murmurs, rubs, or gallops.  Ext: No clubbing, cyanosis, edema  SKIN: There is a 2 cm skin tag with left-sided infarction and bleeding.  With surrounding erythema.  Located in the suprapubic area just right of midline.      Labs: None    Assessment & Plan:     36 y.o. female with the following -     Problem List Items Addressed This Visit     Chronic pain syndrome     Refill meds           Relevant Medications    oxyCODONE-acetaminophen (PERCOCET-10)  MG Tab    oxyCODONE-acetaminophen (PERCOCET-10)  MG Tab (Start on 8/18/2022)    oxyCODONE-acetaminophen (PERCOCET-10)  MG Tab (Start on 9/18/2022)    Endometriosis    Relevant Medications    oxyCODONE-acetaminophen (PERCOCET-10)  MG Tab    oxyCODONE-acetaminophen (PERCOCET-10)  MG Tab (Start on 8/18/2022)    oxyCODONE-acetaminophen (PERCOCET-10)  MG Tab (Start on 9/18/2022)    Skin lesion     Infarcted skin tag 2cm  Diam.  Return for excision and cautery                     Return in about 3 months (around 10/19/2022) for med refills.  Sooner for lesion excision.    Please note that this dictation was created using voice recognition software. I have made every reasonable attempt to correct obvious errors, but I expect that there are errors of grammar and possibly content that I did not discover before " finalizing the note.

## 2022-08-17 ENCOUNTER — TELEPHONE (OUTPATIENT)
Dept: MEDICAL GROUP | Facility: CLINIC | Age: 37
End: 2022-08-17
Payer: MEDICAID

## 2022-08-17 DIAGNOSIS — N80.9 ENDOMETRIOSIS: ICD-10-CM

## 2022-08-17 NOTE — TELEPHONE ENCOUNTER
Patient called stating her drivers license expires tomorrow (8/18) and she is unable to  her medication without a valid ID. She is wondering if you can send in the Percocet today so she is able to  the medication with a valid ID.

## 2022-08-18 RX ORDER — OXYCODONE AND ACETAMINOPHEN 10; 325 MG/1; MG/1
1 TABLET ORAL 3 TIMES DAILY
Qty: 90 TABLET | Refills: 0 | Status: SHIPPED | OUTPATIENT
Start: 2022-08-18 | End: 2022-09-17

## 2022-10-18 ENCOUNTER — OFFICE VISIT (OUTPATIENT)
Dept: MEDICAL GROUP | Facility: CLINIC | Age: 37
End: 2022-10-18
Payer: MEDICAID

## 2022-10-18 VITALS
HEIGHT: 68 IN | DIASTOLIC BLOOD PRESSURE: 81 MMHG | SYSTOLIC BLOOD PRESSURE: 124 MMHG | HEART RATE: 84 BPM | BODY MASS INDEX: 25.25 KG/M2 | WEIGHT: 166.6 LBS | OXYGEN SATURATION: 98 %

## 2022-10-18 DIAGNOSIS — L98.9 SKIN LESION: ICD-10-CM

## 2022-10-18 DIAGNOSIS — G89.4 CHRONIC PAIN SYNDROME: ICD-10-CM

## 2022-10-18 DIAGNOSIS — N80.9 ENDOMETRIOSIS: ICD-10-CM

## 2022-10-18 PROCEDURE — 99213 OFFICE O/P EST LOW 20 MIN: CPT | Performed by: FAMILY MEDICINE

## 2022-10-18 RX ORDER — OXYCODONE AND ACETAMINOPHEN 10; 325 MG/1; MG/1
1 TABLET ORAL 3 TIMES DAILY
Qty: 90 TABLET | Refills: 0 | Status: SHIPPED | OUTPATIENT
Start: 2022-11-17 | End: 2022-12-17

## 2022-10-18 RX ORDER — OXYCODONE AND ACETAMINOPHEN 10; 325 MG/1; MG/1
1 TABLET ORAL 3 TIMES DAILY
Qty: 90 TABLET | Refills: 0 | Status: SHIPPED | OUTPATIENT
Start: 2022-10-18 | End: 2022-11-17

## 2022-10-18 RX ORDER — OXYCODONE AND ACETAMINOPHEN 10; 325 MG/1; MG/1
1 TABLET ORAL 3 TIMES DAILY
Qty: 90 TABLET | Refills: 0 | Status: SHIPPED | OUTPATIENT
Start: 2022-12-17 | End: 2023-01-14 | Stop reason: SDUPTHER

## 2022-10-18 NOTE — PROGRESS NOTES
Subjective:     CC: Controlled substances refill    HPI:   Ally presents today with controlled substances refill.  Her source of pain is from chronic pelvic pain from chronic endometriosis.  Her pain is approximately the same she takes oxycodone approximately 3 times a day and has adequate pain control.  This is not changed.  There is no other red flags.    She also has a fairly large seborrheic keratosis in the suprapubic area.  She will return in December for removal.    Problem   Skin Lesion    Had small mole in vaginal area.  Checked by GYN and told it was ok.  Used podofilyn and went away.  Now changed to different color.  Gets irritated.  Especially with sex.       Endometriosis    Chronic pelvic pain.  On oxycodone. Still about the same     Chronic Pain Syndrome    Needing refills.  Pain management stable         Current Outpatient Medications Ordered in Epic   Medication Sig Dispense Refill    oxyCODONE-acetaminophen (PERCOCET-10)  MG Tab Take 1 Tablet by mouth in the morning, at noon, and at bedtime for 30 days. 90 Tablet 0    [START ON 11/17/2022] oxyCODONE-acetaminophen (PERCOCET-10)  MG Tab Take 1 Tablet by mouth in the morning, at noon, and at bedtime for 30 days. 90 Tablet 0    [START ON 12/17/2022] oxyCODONE-acetaminophen (PERCOCET-10)  MG Tab Take 1 Tablet by mouth in the morning, at noon, and at bedtime for 30 days. 90 Tablet 0    Melatonin 5 MG Tab Take 1 Tab by mouth at bedtime as needed.       No current Epic-ordered facility-administered medications on file.       Health Maintenance:     ROS:  Gen: no fevers/chills, no changes in weight  Eyes: no changes in vision  ENT: no sore throat, no hearing loss, no bloody nose  Pulm: no sob, no cough  CV: no chest pain, no palpitations  GI: no nausea/vomiting, no diarrhea  : no dysuria  MSk: no myalgias  Skin: no rash  Neuro: no headaches, no numbness/tingling  Heme/Lymph: no easy bruising      Objective:     Exam:  /81 (BP  "Location: Right arm, Patient Position: Sitting, BP Cuff Size: Adult)   Pulse 84   Ht 1.727 m (5' 8\")   Wt 75.6 kg (166 lb 9.6 oz)   SpO2 98%   BMI 25.33 kg/m²  Body mass index is 25.33 kg/m².    Gen: Alert and oriented, No apparent distress.  Neck: Neck is supple without lymphadenopathy.  Lungs: Normal effort, CTA bilaterally, no wheezes, rhonchi, or rales  CV: Regular rate and rhythm. No murmurs, rubs, or gallops.  Ext: No clubbing, cyanosis, edema.      Labs: No recent labs    Assessment & Plan:     37 y.o. female with the following -     Problem List Items Addressed This Visit       Chronic pain syndrome     Refill pain meds         Relevant Medications    oxyCODONE-acetaminophen (PERCOCET-10)  MG Tab    oxyCODONE-acetaminophen (PERCOCET-10)  MG Tab (Start on 11/17/2022)    oxyCODONE-acetaminophen (PERCOCET-10)  MG Tab (Start on 12/17/2022)    Endometriosis     Refill pain meds.          Relevant Medications    oxyCODONE-acetaminophen (PERCOCET-10)  MG Tab    oxyCODONE-acetaminophen (PERCOCET-10)  MG Tab (Start on 11/17/2022)    oxyCODONE-acetaminophen (PERCOCET-10)  MG Tab (Start on 12/17/2022)    Skin lesion     Schedule excision                Return in about 2 months (around 12/18/2022) for skin mole removal.    Please note that this dictation was created using voice recognition software. I have made every reasonable attempt to correct obvious errors, but I expect that there are errors of grammar and possibly content that I did not discover before finalizing the note.        "

## 2022-12-21 ENCOUNTER — OFFICE VISIT (OUTPATIENT)
Dept: MEDICAL GROUP | Facility: CLINIC | Age: 37
End: 2022-12-21
Payer: MEDICAID

## 2022-12-21 ENCOUNTER — HOSPITAL ENCOUNTER (OUTPATIENT)
Facility: MEDICAL CENTER | Age: 37
End: 2022-12-21
Attending: FAMILY MEDICINE
Payer: MEDICAID

## 2022-12-21 DIAGNOSIS — M25.542 ARTHRALGIA OF BOTH HANDS: ICD-10-CM

## 2022-12-21 DIAGNOSIS — L98.9 SKIN LESION: ICD-10-CM

## 2022-12-21 DIAGNOSIS — M25.541 ARTHRALGIA OF BOTH HANDS: ICD-10-CM

## 2022-12-21 LAB
FORWARD REASON: SPWHY: NORMAL
FORWARDED TO LAB: SPWHR: NORMAL
SPECIMEN SENT: SPWT1: NORMAL

## 2022-12-21 PROCEDURE — 99213 OFFICE O/P EST LOW 20 MIN: CPT | Mod: 25 | Performed by: FAMILY MEDICINE

## 2022-12-21 PROCEDURE — 11200 RMVL SKIN TAGS UP TO&INC 15: CPT | Performed by: FAMILY MEDICINE

## 2022-12-21 ASSESSMENT — PATIENT HEALTH QUESTIONNAIRE - PHQ9: CLINICAL INTERPRETATION OF PHQ2 SCORE: 0

## 2022-12-21 NOTE — PROCEDURES
3 rather large in tags were removed.  All 3 were first anesthetized with 1% Xylocaine the tags were excised and the base was cauterized using silver nitrate sticks.  There were old roughly 1 cm in diameter.

## 2022-12-21 NOTE — PROGRESS NOTES
"Subjective:     CC: Arthralgia, skin tag removal    HPI:   Ally presents today with primarily a scheduled appointment for skin tag removal in the vaginal area.  Ever, she is also had increased issues with migratory arthralgias.  She is noted primarily in the hands but it also occurs in her feet as well feet radiate pain all the way up to her knees.  She feels that all her bones hurt \".    She also has 3 skin tags and around the vaginal area that seem to sprout up from a past history of vaginal warts.  One is actually on the lower abdomen and the other 2 in the perineum area please see dictation for that removal.    Problem   Arthralgia of Both Hands    Migratory arthralgia involving both hands and both feet.  No noticed swelling.  Seems like all her bones hurt.  No known injury.      Swelling MCPs and decreased .  Swelling both wrists.  Feet very tender, no redness or swelling noted.         Current Outpatient Medications Ordered in Epic   Medication Sig Dispense Refill    oxyCODONE-acetaminophen (PERCOCET-10)  MG Tab Take 1 Tablet by mouth in the morning, at noon, and at bedtime for 30 days. 90 Tablet 0    Melatonin 5 MG Tab Take 1 Tab by mouth at bedtime as needed.       No current Epic-ordered facility-administered medications on file.       Health Maintenance:     ROS:  Gen: no fevers/chills, no changes in weight  Eyes: no changes in vision  ENT: no sore throat, no hearing loss, no bloody nose  Pulm: no sob, no cough  CV: no chest pain, no palpitations  GI: no nausea/vomiting, no diarrhea  : no dysuria  MSk: no myalgias  Skin: no rash  Neuro: no headaches, no numbness/tingling  Heme/Lymph: no easy bruising      Objective:     Exam:  BP (P) 117/77 (BP Location: Left arm, Patient Position: Sitting, BP Cuff Size: Adult)   Pulse (P) 67   Ht (P) 1.727 m (5' 8\")   Wt (P) 75.3 kg (166 lb)   SpO2 (P) 98%   BMI (P) 25.24 kg/m²  Body mass index is 25.24 kg/m² (pended).    Gen: Alert and oriented, No " apparent distress.  Neck: Neck is supple without lymphadenopathy.  Lungs: Normal effort, CTA bilaterally, no wheezes, rhonchi, or rales  CV: Regular rate and rhythm. No murmurs, rubs, or gallops.  Ext: No clubbing, cyanosis, edema.      Labs: non    Assessment & Plan:     37 y.o. female with the following -     Problem List Items Addressed This Visit       Skin lesion    Relevant Orders    Consent for all Surgical, Special Diagnostic or Therapeutic Procedures    Pathology Specimen    Pathology Specimen    Arthralgia of both hands     Could be early RA.  Will get labs.         Relevant Orders    CBC WITH DIFFERENTIAL    Comp Metabolic Panel    TSH WITH REFLEX TO FT4    Sed Rate    CCP    RHEUMATOID ARTHRITIS FACTOR           No follow-ups on file.    Please note that this dictation was created using voice recognition software. I have made every reasonable attempt to correct obvious errors, but I expect that there are errors of grammar and possibly content that I did not discover before finalizing the note.

## 2023-01-14 DIAGNOSIS — N80.9 ENDOMETRIOSIS: ICD-10-CM

## 2023-01-14 RX ORDER — OXYCODONE AND ACETAMINOPHEN 10; 325 MG/1; MG/1
1 TABLET ORAL 3 TIMES DAILY
Qty: 90 TABLET | Refills: 0 | Status: SHIPPED | OUTPATIENT
Start: 2023-01-14 | End: 2023-02-13

## 2023-01-23 ENCOUNTER — APPOINTMENT (OUTPATIENT)
Dept: MEDICAL GROUP | Facility: CLINIC | Age: 38
End: 2023-01-23
Payer: MEDICAID

## 2023-03-13 DIAGNOSIS — G89.4 CHRONIC PAIN SYNDROME: ICD-10-CM

## 2023-03-13 RX ORDER — OXYCODONE AND ACETAMINOPHEN 10; 325 MG/1; MG/1
1 TABLET ORAL EVERY 4 HOURS PRN
Qty: 90 TABLET | Refills: 0 | Status: SHIPPED | OUTPATIENT
Start: 2023-03-13 | End: 2023-03-14 | Stop reason: SDUPTHER

## 2023-03-14 DIAGNOSIS — N80.9 ENDOMETRIOSIS: ICD-10-CM

## 2023-03-14 DIAGNOSIS — M79.7 FIBROMYALGIA: ICD-10-CM

## 2023-03-14 RX ORDER — OXYCODONE AND ACETAMINOPHEN 10; 325 MG/1; MG/1
1 TABLET ORAL EVERY 4 HOURS PRN
Qty: 90 TABLET | Refills: 0 | Status: SHIPPED | OUTPATIENT
Start: 2023-05-13 | End: 2023-06-11 | Stop reason: SDUPTHER

## 2023-03-14 RX ORDER — OXYCODONE AND ACETAMINOPHEN 10; 325 MG/1; MG/1
1 TABLET ORAL EVERY 4 HOURS PRN
Qty: 90 TABLET | Refills: 0 | Status: SHIPPED | OUTPATIENT
Start: 2023-04-13 | End: 2023-05-13

## 2023-03-14 RX ORDER — OXYCODONE AND ACETAMINOPHEN 10; 325 MG/1; MG/1
1 TABLET ORAL EVERY 4 HOURS PRN
Qty: 90 TABLET | Refills: 0 | Status: SHIPPED | OUTPATIENT
Start: 2023-03-14 | End: 2023-04-13

## 2023-03-23 RX ORDER — VARENICLINE TARTRATE
1 KIT DAILY
Qty: 1 EACH | Refills: 3 | Status: SHIPPED | OUTPATIENT
Start: 2023-03-23

## 2023-06-11 DIAGNOSIS — N80.9 ENDOMETRIOSIS: ICD-10-CM

## 2023-06-11 DIAGNOSIS — M79.7 FIBROMYALGIA: ICD-10-CM

## 2023-06-12 RX ORDER — OXYCODONE AND ACETAMINOPHEN 10; 325 MG/1; MG/1
1 TABLET ORAL EVERY 4 HOURS PRN
Qty: 90 TABLET | Refills: 0 | Status: SHIPPED | OUTPATIENT
Start: 2023-06-12 | End: 2023-07-11 | Stop reason: SDUPTHER

## 2023-07-09 DIAGNOSIS — N80.9 ENDOMETRIOSIS: ICD-10-CM

## 2023-07-09 DIAGNOSIS — M79.7 FIBROMYALGIA: ICD-10-CM

## 2023-07-11 DIAGNOSIS — M79.7 FIBROMYALGIA: ICD-10-CM

## 2023-07-11 DIAGNOSIS — N80.9 ENDOMETRIOSIS: ICD-10-CM

## 2023-07-11 RX ORDER — OXYCODONE AND ACETAMINOPHEN 10; 325 MG/1; MG/1
1 TABLET ORAL EVERY 4 HOURS PRN
Qty: 90 TABLET | Refills: 0 | Status: SHIPPED | OUTPATIENT
Start: 2023-07-11 | End: 2023-08-10

## 2023-07-12 ENCOUNTER — PATIENT MESSAGE (OUTPATIENT)
Dept: MEDICAL GROUP | Facility: CLINIC | Age: 38
End: 2023-07-12
Payer: MEDICAID

## 2023-07-12 DIAGNOSIS — G89.4 CHRONIC PAIN SYNDROME: ICD-10-CM

## 2023-07-12 DIAGNOSIS — N80.9 ENDOMETRIOSIS: ICD-10-CM

## 2023-07-12 RX ORDER — HYDROCODONE BITARTRATE AND ACETAMINOPHEN 10; 325 MG/1; MG/1
1 TABLET ORAL EVERY 6 HOURS PRN
Qty: 90 TABLET | Refills: 0 | Status: SHIPPED | OUTPATIENT
Start: 2023-07-12 | End: 2023-08-08 | Stop reason: SDUPTHER

## 2023-08-08 DIAGNOSIS — N80.9 ENDOMETRIOSIS: ICD-10-CM

## 2023-08-08 DIAGNOSIS — G89.4 CHRONIC PAIN SYNDROME: ICD-10-CM

## 2023-08-08 RX ORDER — HYDROCODONE BITARTRATE AND ACETAMINOPHEN 10; 325 MG/1; MG/1
1 TABLET ORAL EVERY 6 HOURS PRN
Qty: 90 TABLET | Refills: 0 | Status: SHIPPED | OUTPATIENT
Start: 2023-08-08 | End: 2023-09-05 | Stop reason: SDUPTHER

## 2023-09-05 ENCOUNTER — PATIENT MESSAGE (OUTPATIENT)
Dept: MEDICAL GROUP | Facility: CLINIC | Age: 38
End: 2023-09-05
Payer: MEDICAID

## 2023-09-05 DIAGNOSIS — G89.4 CHRONIC PAIN SYNDROME: ICD-10-CM

## 2023-09-05 DIAGNOSIS — N80.9 ENDOMETRIOSIS: ICD-10-CM

## 2023-09-05 RX ORDER — HYDROCODONE BITARTRATE AND ACETAMINOPHEN 10; 325 MG/1; MG/1
1 TABLET ORAL EVERY 6 HOURS PRN
Qty: 90 TABLET | Refills: 0 | Status: SHIPPED | OUTPATIENT
Start: 2023-09-05 | End: 2023-09-07 | Stop reason: DRUGHIGH

## 2023-09-07 DIAGNOSIS — N80.9 ENDOMETRIOSIS: ICD-10-CM

## 2023-09-07 DIAGNOSIS — G89.4 CHRONIC PAIN SYNDROME: ICD-10-CM

## 2023-09-07 RX ORDER — HYDROCODONE BITARTRATE AND ACETAMINOPHEN 7.5; 325 MG/1; MG/1
1 TABLET ORAL EVERY 6 HOURS PRN
Qty: 120 TABLET | Refills: 0 | Status: SHIPPED | OUTPATIENT
Start: 2023-09-07 | End: 2023-09-07 | Stop reason: SDUPTHER

## 2023-09-07 RX ORDER — HYDROCODONE BITARTRATE AND ACETAMINOPHEN 7.5; 325 MG/1; MG/1
1 TABLET ORAL EVERY 6 HOURS PRN
Qty: 120 TABLET | Refills: 0 | Status: SHIPPED | OUTPATIENT
Start: 2023-09-07 | End: 2023-10-03 | Stop reason: SDUPTHER

## 2023-09-16 ENCOUNTER — HOSPITAL ENCOUNTER (EMERGENCY)
Facility: MEDICAL CENTER | Age: 38
End: 2023-09-16
Attending: SPECIALIST | Admitting: SPECIALIST
Payer: MEDICAID

## 2023-09-16 VITALS
HEART RATE: 110 BPM | SYSTOLIC BLOOD PRESSURE: 129 MMHG | OXYGEN SATURATION: 98 % | DIASTOLIC BLOOD PRESSURE: 68 MMHG | RESPIRATION RATE: 18 BRPM

## 2023-09-16 PROCEDURE — 302449 STATCHG TRIAGE ONLY (STATISTIC)

## 2023-09-16 PROCEDURE — 59025 FETAL NON-STRESS TEST: CPT

## 2023-09-16 ASSESSMENT — PAIN SCALES - GENERAL: PAINLEVEL: 3

## 2023-09-16 NOTE — PROGRESS NOTES
"Pt presents to L&D with complaints of a \"thumb\" sticking out of her vaginal area. Pt ambulated to [facility] for assessment.     1332 TOCO and EFM applied, VSS. Pt reports +FM, denies LOF or VB. Pt states she has noticed since Thursday when she wipes she feels the toilet paper \"sticking\" to something. She didn't think much of it until this morning when it seemed to be a bit worse and took a picture. Pt describes it as a \"thumb\" sticking out of her vagina. Pt has had some vaginal discomfort as well as pelvic pain. Pt states she googled some things and made her self really worried. Upon assessment, small vaginal tissue tag at the base of her perineum. No concerning per this RN as the tissue looks healthy and theres no signs of infection, abrasions or hematoma. PT does have an appointment on Monday with Dr. Giles. RN will updated Dr. Raina levy.     1402 Dr. Raina Levy updated on pt status. Orders for discharge received.     1415 RN at bedside, POC discussed. Pt reassured that follow up on Monday is appropriate. All questions answered. Pt discharged home in stable condition.   "

## 2023-10-03 DIAGNOSIS — N80.9 ENDOMETRIOSIS: ICD-10-CM

## 2023-10-03 DIAGNOSIS — G89.4 CHRONIC PAIN SYNDROME: ICD-10-CM

## 2023-10-04 RX ORDER — HYDROCODONE BITARTRATE AND ACETAMINOPHEN 7.5; 325 MG/1; MG/1
1 TABLET ORAL EVERY 6 HOURS PRN
Qty: 120 TABLET | Refills: 0 | Status: SHIPPED | OUTPATIENT
Start: 2023-10-04 | End: 2023-10-06 | Stop reason: SDUPTHER

## 2023-10-06 DIAGNOSIS — N80.9 ENDOMETRIOSIS: ICD-10-CM

## 2023-10-06 DIAGNOSIS — G89.4 CHRONIC PAIN SYNDROME: ICD-10-CM

## 2023-10-06 RX ORDER — HYDROCODONE BITARTRATE AND ACETAMINOPHEN 7.5; 325 MG/1; MG/1
1 TABLET ORAL EVERY 6 HOURS PRN
Qty: 120 TABLET | Refills: 0 | Status: SHIPPED | OUTPATIENT
Start: 2023-10-06 | End: 2023-11-05

## 2023-11-27 ENCOUNTER — PATIENT MESSAGE (OUTPATIENT)
Dept: MEDICAL GROUP | Facility: CLINIC | Age: 38
End: 2023-11-27
Payer: MEDICAID

## 2023-11-27 DIAGNOSIS — G89.4 CHRONIC PAIN SYNDROME: ICD-10-CM

## 2023-11-27 DIAGNOSIS — N80.9 ENDOMETRIOSIS: ICD-10-CM

## 2023-11-27 RX ORDER — HYDROCODONE BITARTRATE AND ACETAMINOPHEN 7.5; 325 MG/1; MG/1
1 TABLET ORAL EVERY 6 HOURS PRN
Qty: 120 TABLET | Refills: 0 | Status: SHIPPED | OUTPATIENT
Start: 2023-11-27 | End: 2023-11-29 | Stop reason: SDUPTHER

## 2023-11-29 ENCOUNTER — ANESTHESIA EVENT (OUTPATIENT)
Dept: ANESTHESIOLOGY | Facility: MEDICAL CENTER | Age: 38
End: 2023-11-29
Payer: MEDICAID

## 2023-11-29 ENCOUNTER — ANESTHESIA (OUTPATIENT)
Dept: ANESTHESIOLOGY | Facility: MEDICAL CENTER | Age: 38
End: 2023-11-29
Payer: MEDICAID

## 2023-11-29 ENCOUNTER — HOSPITAL ENCOUNTER (INPATIENT)
Facility: MEDICAL CENTER | Age: 38
LOS: 4 days | End: 2023-12-03
Attending: SPECIALIST | Admitting: SPECIALIST
Payer: MEDICAID

## 2023-11-29 DIAGNOSIS — N80.9 ENDOMETRIOSIS: ICD-10-CM

## 2023-11-29 DIAGNOSIS — G89.4 CHRONIC PAIN SYNDROME: ICD-10-CM

## 2023-11-29 DIAGNOSIS — R10.2 PELVIC PAIN: ICD-10-CM

## 2023-11-29 LAB
ALBUMIN SERPL BCP-MCNC: 3.7 G/DL (ref 3.2–4.9)
ALBUMIN/GLOB SERPL: 1.2 G/DL
ALP SERPL-CCNC: 121 U/L (ref 30–99)
ALT SERPL-CCNC: 12 U/L (ref 2–50)
ANION GAP SERPL CALC-SCNC: 13 MMOL/L (ref 7–16)
AST SERPL-CCNC: 11 U/L (ref 12–45)
BASOPHILS # BLD AUTO: 0.4 % (ref 0–1.8)
BASOPHILS # BLD: 0.04 K/UL (ref 0–0.12)
BILIRUB SERPL-MCNC: 0.2 MG/DL (ref 0.1–1.5)
BUN SERPL-MCNC: 11 MG/DL (ref 8–22)
CALCIUM ALBUM COR SERPL-MCNC: 9.1 MG/DL (ref 8.5–10.5)
CALCIUM SERPL-MCNC: 8.9 MG/DL (ref 8.5–10.5)
CHLORIDE SERPL-SCNC: 104 MMOL/L (ref 96–112)
CO2 SERPL-SCNC: 20 MMOL/L (ref 20–33)
CREAT SERPL-MCNC: 0.68 MG/DL (ref 0.5–1.4)
CREAT UR-MCNC: 92.02 MG/DL
EOSINOPHIL # BLD AUTO: 0.06 K/UL (ref 0–0.51)
EOSINOPHIL NFR BLD: 0.5 % (ref 0–6.9)
ERYTHROCYTE [DISTWIDTH] IN BLOOD BY AUTOMATED COUNT: 41.2 FL (ref 35.9–50)
GFR SERPLBLD CREATININE-BSD FMLA CKD-EPI: 113 ML/MIN/1.73 M 2
GLOBULIN SER CALC-MCNC: 3.1 G/DL (ref 1.9–3.5)
GLUCOSE SERPL-MCNC: 146 MG/DL (ref 65–99)
HCT VFR BLD AUTO: 37.8 % (ref 37–47)
HGB BLD-MCNC: 12.9 G/DL (ref 12–16)
HOLDING TUBE BB 8507: NORMAL
IMM GRANULOCYTES # BLD AUTO: 0.09 K/UL (ref 0–0.11)
IMM GRANULOCYTES NFR BLD AUTO: 0.8 % (ref 0–0.9)
LYMPHOCYTES # BLD AUTO: 2.24 K/UL (ref 1–4.8)
LYMPHOCYTES NFR BLD: 19.8 % (ref 22–41)
MCH RBC QN AUTO: 29.2 PG (ref 27–33)
MCHC RBC AUTO-ENTMCNC: 34.1 G/DL (ref 32.2–35.5)
MCV RBC AUTO: 85.5 FL (ref 81.4–97.8)
MONOCYTES # BLD AUTO: 0.64 K/UL (ref 0–0.85)
MONOCYTES NFR BLD AUTO: 5.7 % (ref 0–13.4)
NEUTROPHILS # BLD AUTO: 8.24 K/UL (ref 1.82–7.42)
NEUTROPHILS NFR BLD: 72.8 % (ref 44–72)
NRBC # BLD AUTO: 0 K/UL
NRBC BLD-RTO: 0 /100 WBC (ref 0–0.2)
PLATELET # BLD AUTO: 295 K/UL (ref 164–446)
PMV BLD AUTO: 10.2 FL (ref 9–12.9)
POTASSIUM SERPL-SCNC: 3.8 MMOL/L (ref 3.6–5.5)
PROT SERPL-MCNC: 6.8 G/DL (ref 6–8.2)
PROT UR-MCNC: 92 MG/DL (ref 0–15)
PROT/CREAT UR: 1000 MG/G (ref 10–107)
RBC # BLD AUTO: 4.42 M/UL (ref 4.2–5.4)
SODIUM SERPL-SCNC: 137 MMOL/L (ref 135–145)
T PALLIDUM AB SER QL IA: NORMAL
WBC # BLD AUTO: 11.3 K/UL (ref 4.8–10.8)

## 2023-11-29 PROCEDURE — 85025 COMPLETE CBC W/AUTO DIFF WBC: CPT

## 2023-11-29 PROCEDURE — 80053 COMPREHEN METABOLIC PANEL: CPT

## 2023-11-29 PROCEDURE — 700102 HCHG RX REV CODE 250 W/ 637 OVERRIDE(OP): Performed by: SPECIALIST

## 2023-11-29 PROCEDURE — 700105 HCHG RX REV CODE 258: Performed by: SPECIALIST

## 2023-11-29 PROCEDURE — 700111 HCHG RX REV CODE 636 W/ 250 OVERRIDE (IP): Performed by: ANESTHESIOLOGY

## 2023-11-29 PROCEDURE — 84156 ASSAY OF PROTEIN URINE: CPT

## 2023-11-29 PROCEDURE — A9270 NON-COVERED ITEM OR SERVICE: HCPCS | Performed by: SPECIALIST

## 2023-11-29 PROCEDURE — 700111 HCHG RX REV CODE 636 W/ 250 OVERRIDE (IP)

## 2023-11-29 PROCEDURE — 700111 HCHG RX REV CODE 636 W/ 250 OVERRIDE (IP): Mod: JZ | Performed by: SPECIALIST

## 2023-11-29 PROCEDURE — 303615 HCHG EPIDURAL/SPINAL ANESTHESIA FOR LABOR

## 2023-11-29 PROCEDURE — 700111 HCHG RX REV CODE 636 W/ 250 OVERRIDE (IP): Mod: JZ | Performed by: ANESTHESIOLOGY

## 2023-11-29 PROCEDURE — 770002 HCHG ROOM/CARE - OB PRIVATE (112)

## 2023-11-29 PROCEDURE — 82570 ASSAY OF URINE CREATININE: CPT

## 2023-11-29 PROCEDURE — 36415 COLL VENOUS BLD VENIPUNCTURE: CPT

## 2023-11-29 PROCEDURE — 700105 HCHG RX REV CODE 258: Performed by: ANESTHESIOLOGY

## 2023-11-29 PROCEDURE — 3E033VJ INTRODUCTION OF OTHER HORMONE INTO PERIPHERAL VEIN, PERCUTANEOUS APPROACH: ICD-10-PCS | Performed by: SPECIALIST

## 2023-11-29 PROCEDURE — 86780 TREPONEMA PALLIDUM: CPT

## 2023-11-29 PROCEDURE — 302449 STATCHG TRIAGE ONLY (STATISTIC)

## 2023-11-29 RX ORDER — ROPIVACAINE HYDROCHLORIDE 2 MG/ML
INJECTION, SOLUTION EPIDURAL; INFILTRATION; PERINEURAL CONTINUOUS
Status: DISCONTINUED | OUTPATIENT
Start: 2023-11-29 | End: 2023-11-30

## 2023-11-29 RX ORDER — NIFEDIPINE 10 MG/1
CAPSULE ORAL
Status: ACTIVE
Start: 2023-11-29 | End: 2023-11-30

## 2023-11-29 RX ORDER — SODIUM CHLORIDE, SODIUM LACTATE, POTASSIUM CHLORIDE, AND CALCIUM CHLORIDE .6; .31; .03; .02 G/100ML; G/100ML; G/100ML; G/100ML
1000 INJECTION, SOLUTION INTRAVENOUS
Status: COMPLETED | OUTPATIENT
Start: 2023-11-29 | End: 2023-11-29

## 2023-11-29 RX ORDER — EPHEDRINE SULFATE 50 MG/ML
5 INJECTION, SOLUTION INTRAVENOUS
Status: DISCONTINUED | OUTPATIENT
Start: 2023-11-29 | End: 2023-11-30 | Stop reason: HOSPADM

## 2023-11-29 RX ORDER — SODIUM CHLORIDE, SODIUM LACTATE, POTASSIUM CHLORIDE, CALCIUM CHLORIDE 600; 310; 30; 20 MG/100ML; MG/100ML; MG/100ML; MG/100ML
1000 INJECTION, SOLUTION INTRAVENOUS CONTINUOUS
Status: DISCONTINUED | OUTPATIENT
Start: 2023-11-29 | End: 2023-11-30

## 2023-11-29 RX ORDER — HYDROCODONE BITARTRATE AND ACETAMINOPHEN 7.5; 325 MG/1; MG/1
1 TABLET ORAL EVERY 6 HOURS PRN
Qty: 120 TABLET | Refills: 0 | Status: SHIPPED | OUTPATIENT
Start: 2023-11-29 | End: 2023-12-27 | Stop reason: SDUPTHER

## 2023-11-29 RX ORDER — DEXTROSE, SODIUM CHLORIDE, SODIUM LACTATE, POTASSIUM CHLORIDE, AND CALCIUM CHLORIDE 5; .6; .31; .03; .02 G/100ML; G/100ML; G/100ML; G/100ML; G/100ML
INJECTION, SOLUTION INTRAVENOUS CONTINUOUS
Status: DISCONTINUED | OUTPATIENT
Start: 2023-11-30 | End: 2023-11-30

## 2023-11-29 RX ORDER — ONDANSETRON 2 MG/ML
4 INJECTION INTRAMUSCULAR; INTRAVENOUS EVERY 6 HOURS PRN
Status: DISCONTINUED | OUTPATIENT
Start: 2023-11-29 | End: 2023-11-30 | Stop reason: HOSPADM

## 2023-11-29 RX ORDER — CITRIC ACID/SODIUM CITRATE 334-500MG
30 SOLUTION, ORAL ORAL EVERY 6 HOURS PRN
Status: DISCONTINUED | OUTPATIENT
Start: 2023-11-29 | End: 2023-11-30 | Stop reason: HOSPADM

## 2023-11-29 RX ORDER — BUPIVACAINE HYDROCHLORIDE 2.5 MG/ML
INJECTION, SOLUTION EPIDURAL; INFILTRATION; INTRACAUDAL PRN
Status: DISCONTINUED | OUTPATIENT
Start: 2023-11-29 | End: 2023-11-30 | Stop reason: SURG

## 2023-11-29 RX ORDER — NIFEDIPINE 10 MG/1
10 CAPSULE ORAL ONCE
Status: COMPLETED | OUTPATIENT
Start: 2023-11-29 | End: 2023-11-29

## 2023-11-29 RX ORDER — IBUPROFEN 800 MG/1
800 TABLET ORAL
Status: COMPLETED | OUTPATIENT
Start: 2023-11-29 | End: 2023-11-30

## 2023-11-29 RX ORDER — ALUMINA, MAGNESIA, AND SIMETHICONE 2400; 2400; 240 MG/30ML; MG/30ML; MG/30ML
30 SUSPENSION ORAL EVERY 6 HOURS PRN
Status: DISCONTINUED | OUTPATIENT
Start: 2023-11-29 | End: 2023-11-30 | Stop reason: HOSPADM

## 2023-11-29 RX ORDER — ONDANSETRON 4 MG/1
4 TABLET, ORALLY DISINTEGRATING ORAL EVERY 6 HOURS PRN
Status: DISCONTINUED | OUTPATIENT
Start: 2023-11-29 | End: 2023-11-30 | Stop reason: HOSPADM

## 2023-11-29 RX ORDER — ACETAMINOPHEN 500 MG
1000 TABLET ORAL
Status: COMPLETED | OUTPATIENT
Start: 2023-11-29 | End: 2023-11-30

## 2023-11-29 RX ORDER — SODIUM CHLORIDE, SODIUM LACTATE, POTASSIUM CHLORIDE, AND CALCIUM CHLORIDE .6; .31; .03; .02 G/100ML; G/100ML; G/100ML; G/100ML
250 INJECTION, SOLUTION INTRAVENOUS PRN
Status: DISCONTINUED | OUTPATIENT
Start: 2023-11-29 | End: 2023-11-30 | Stop reason: HOSPADM

## 2023-11-29 RX ORDER — BUPIVACAINE HYDROCHLORIDE 2.5 MG/ML
INJECTION, SOLUTION EPIDURAL; INFILTRATION; INTRACAUDAL
Status: COMPLETED
Start: 2023-11-29 | End: 2023-11-29

## 2023-11-29 RX ORDER — OXYTOCIN 10 [USP'U]/ML
10 INJECTION, SOLUTION INTRAMUSCULAR; INTRAVENOUS
Status: DISCONTINUED | OUTPATIENT
Start: 2023-11-29 | End: 2023-11-30 | Stop reason: HOSPADM

## 2023-11-29 RX ORDER — TERBUTALINE SULFATE 1 MG/ML
0.25 INJECTION, SOLUTION SUBCUTANEOUS
Status: DISCONTINUED | OUTPATIENT
Start: 2023-11-29 | End: 2023-11-30 | Stop reason: HOSPADM

## 2023-11-29 RX ORDER — ROPIVACAINE HYDROCHLORIDE 2 MG/ML
INJECTION, SOLUTION EPIDURAL; INFILTRATION; PERINEURAL
Status: ACTIVE
Start: 2023-11-29 | End: 2023-11-30

## 2023-11-29 RX ORDER — LIDOCAINE HYDROCHLORIDE 10 MG/ML
20 INJECTION, SOLUTION INFILTRATION; PERINEURAL
Status: DISCONTINUED | OUTPATIENT
Start: 2023-11-29 | End: 2023-11-30 | Stop reason: HOSPADM

## 2023-11-29 RX ADMIN — SODIUM CHLORIDE, POTASSIUM CHLORIDE, SODIUM LACTATE AND CALCIUM CHLORIDE 1000 ML: 600; 310; 30; 20 INJECTION, SOLUTION INTRAVENOUS at 19:40

## 2023-11-29 RX ADMIN — ROPIVACAINE HYDROCHLORIDE: 2 INJECTION, SOLUTION EPIDURAL; INFILTRATION at 21:50

## 2023-11-29 RX ADMIN — BUPIVACAINE HYDROCHLORIDE 3 ML: 2.5 INJECTION, SOLUTION EPIDURAL; INFILTRATION; INTRACAUDAL at 21:51

## 2023-11-29 RX ADMIN — FENTANYL CITRATE 50 MCG: 50 INJECTION, SOLUTION INTRAMUSCULAR; INTRAVENOUS at 21:51

## 2023-11-29 RX ADMIN — BUPIVACAINE HYDROCHLORIDE: 2.5 INJECTION, SOLUTION EPIDURAL; INFILTRATION; INTRACAUDAL at 21:30

## 2023-11-29 RX ADMIN — OXYTOCIN 2 MILLI-UNITS/MIN: 10 INJECTION, SOLUTION INTRAMUSCULAR; INTRAVENOUS at 19:43

## 2023-11-29 RX ADMIN — FENTANYL CITRATE 50 MCG: 50 INJECTION, SOLUTION INTRAMUSCULAR; INTRAVENOUS at 21:46

## 2023-11-29 RX ADMIN — SODIUM CHLORIDE, POTASSIUM CHLORIDE, SODIUM LACTATE AND CALCIUM CHLORIDE 1000 ML: 600; 310; 30; 20 INJECTION, SOLUTION INTRAVENOUS at 21:30

## 2023-11-29 RX ADMIN — NIFEDIPINE 10 MG: 10 CAPSULE ORAL at 20:47

## 2023-11-29 RX ADMIN — BUPIVACAINE HYDROCHLORIDE 3 ML: 2.5 INJECTION, SOLUTION EPIDURAL; INFILTRATION; INTRACAUDAL at 21:46

## 2023-11-29 RX ADMIN — SODIUM CHLORIDE, POTASSIUM CHLORIDE, SODIUM LACTATE AND CALCIUM CHLORIDE 1000 ML: 600; 310; 30; 20 INJECTION, SOLUTION INTRAVENOUS at 22:06

## 2023-11-29 ASSESSMENT — COPD QUESTIONNAIRES
COPD SCREENING SCORE: 4
DO YOU EVER COUGH UP ANY MUCUS OR PHLEGM?: YES, A FEW DAYS A WEEK OR MONTH
DURING THE PAST 4 WEEKS HOW MUCH DID YOU FEEL SHORT OF BREATH: SOME OF THE TIME
IN THE PAST 12 MONTHS DO YOU DO LESS THAN YOU USED TO BECAUSE OF YOUR BREATHING PROBLEMS: DISAGREE/UNSURE
HAVE YOU SMOKED AT LEAST 100 CIGARETTES IN YOUR ENTIRE LIFE: YES

## 2023-11-29 ASSESSMENT — LIFESTYLE VARIABLES
TOTAL SCORE: 0
EVER FELT BAD OR GUILTY ABOUT YOUR DRINKING: NO
HAVE PEOPLE ANNOYED YOU BY CRITICIZING YOUR DRINKING: NO
EVER_SMOKED: YES
TOTAL SCORE: 0
TOTAL SCORE: 0
CONSUMPTION TOTAL: INCOMPLETE
HAVE YOU EVER FELT YOU SHOULD CUT DOWN ON YOUR DRINKING: NO
EVER HAD A DRINK FIRST THING IN THE MORNING TO STEADY YOUR NERVES TO GET RID OF A HANGOVER: NO
ALCOHOL_USE: NO

## 2023-11-29 ASSESSMENT — PATIENT HEALTH QUESTIONNAIRE - PHQ9
1. LITTLE INTEREST OR PLEASURE IN DOING THINGS: NOT AT ALL
SUM OF ALL RESPONSES TO PHQ9 QUESTIONS 1 AND 2: 0
2. FEELING DOWN, DEPRESSED, IRRITABLE, OR HOPELESS: NOT AT ALL

## 2023-11-29 ASSESSMENT — PAIN DESCRIPTION - PAIN TYPE: TYPE: INTRACTABLE PAIN

## 2023-11-30 LAB
APTT PPP: 27.4 SEC (ref 24.7–36)
FIBRINOGEN PPP-MCNC: 430 MG/DL (ref 215–460)
INR PPP: 1.01 (ref 0.87–1.13)
PROTHROMBIN TIME: 13.4 SEC (ref 12–14.6)

## 2023-11-30 PROCEDURE — 700105 HCHG RX REV CODE 258: Performed by: SPECIALIST

## 2023-11-30 PROCEDURE — 36415 COLL VENOUS BLD VENIPUNCTURE: CPT

## 2023-11-30 PROCEDURE — 59409 OBSTETRICAL CARE: CPT

## 2023-11-30 PROCEDURE — 85610 PROTHROMBIN TIME: CPT

## 2023-11-30 PROCEDURE — 85384 FIBRINOGEN ACTIVITY: CPT

## 2023-11-30 PROCEDURE — A9270 NON-COVERED ITEM OR SERVICE: HCPCS | Performed by: SPECIALIST

## 2023-11-30 PROCEDURE — 700101 HCHG RX REV CODE 250: Performed by: SPECIALIST

## 2023-11-30 PROCEDURE — 0KQM0ZZ REPAIR PERINEUM MUSCLE, OPEN APPROACH: ICD-10-PCS | Performed by: SPECIALIST

## 2023-11-30 PROCEDURE — 700111 HCHG RX REV CODE 636 W/ 250 OVERRIDE (IP): Mod: JZ | Performed by: SPECIALIST

## 2023-11-30 PROCEDURE — 85730 THROMBOPLASTIN TIME PARTIAL: CPT

## 2023-11-30 PROCEDURE — 770002 HCHG ROOM/CARE - OB PRIVATE (112)

## 2023-11-30 PROCEDURE — 700102 HCHG RX REV CODE 250 W/ 637 OVERRIDE(OP): Performed by: SPECIALIST

## 2023-11-30 PROCEDURE — 304965 HCHG RECOVERY SERVICES

## 2023-11-30 RX ORDER — VITAMIN A ACETATE, BETA CAROTENE, ASCORBIC ACID, CHOLECALCIFEROL, .ALPHA.-TOCOPHEROL ACETATE, DL-, THIAMINE MONONITRATE, RIBOFLAVIN, NIACINAMIDE, PYRIDOXINE HYDROCHLORIDE, FOLIC ACID, CYANOCOBALAMIN, CALCIUM CARBONATE, FERROUS FUMARATE, ZINC OXIDE, CUPRIC OXIDE 3080; 12; 120; 400; 1; 1.84; 3; 20; 22; 920; 25; 200; 27; 10; 2 [IU]/1; UG/1; MG/1; [IU]/1; MG/1; MG/1; MG/1; MG/1; MG/1; [IU]/1; MG/1; MG/1; MG/1; MG/1; MG/1
1 TABLET, FILM COATED ORAL
Status: DISCONTINUED | OUTPATIENT
Start: 2023-11-30 | End: 2023-12-03 | Stop reason: HOSPADM

## 2023-11-30 RX ORDER — OXYCODONE HYDROCHLORIDE 5 MG/1
5 TABLET ORAL EVERY 4 HOURS PRN
Status: DISCONTINUED | OUTPATIENT
Start: 2023-11-30 | End: 2023-12-03 | Stop reason: HOSPADM

## 2023-11-30 RX ORDER — LABETALOL HYDROCHLORIDE 5 MG/ML
20-80 INJECTION, SOLUTION INTRAVENOUS
Status: DISCONTINUED | OUTPATIENT
Start: 2023-11-30 | End: 2023-11-30 | Stop reason: HOSPADM

## 2023-11-30 RX ORDER — SIMETHICONE 125 MG
125 TABLET,CHEWABLE ORAL 4 TIMES DAILY PRN
Status: DISCONTINUED | OUTPATIENT
Start: 2023-11-30 | End: 2023-12-03 | Stop reason: HOSPADM

## 2023-11-30 RX ORDER — MISOPROSTOL 200 UG/1
800 TABLET ORAL ONCE
Status: COMPLETED | OUTPATIENT
Start: 2023-11-30 | End: 2023-11-30

## 2023-11-30 RX ORDER — CARBOPROST TROMETHAMINE 250 UG/ML
250 INJECTION, SOLUTION INTRAMUSCULAR ONCE
Status: COMPLETED | OUTPATIENT
Start: 2023-11-30 | End: 2023-11-30

## 2023-11-30 RX ORDER — HYDRALAZINE HYDROCHLORIDE 20 MG/ML
5-10 INJECTION INTRAMUSCULAR; INTRAVENOUS
Status: DISCONTINUED | OUTPATIENT
Start: 2023-11-30 | End: 2023-11-30 | Stop reason: HOSPADM

## 2023-11-30 RX ORDER — IBUPROFEN 800 MG/1
800 TABLET ORAL EVERY 8 HOURS PRN
Status: DISCONTINUED | OUTPATIENT
Start: 2023-11-30 | End: 2023-12-03 | Stop reason: HOSPADM

## 2023-11-30 RX ORDER — ACETAMINOPHEN 500 MG
1000 TABLET ORAL EVERY 6 HOURS PRN
Status: DISCONTINUED | OUTPATIENT
Start: 2023-11-30 | End: 2023-12-03 | Stop reason: HOSPADM

## 2023-11-30 RX ORDER — NIFEDIPINE 10 MG/1
10 CAPSULE ORAL
Status: DISCONTINUED | OUTPATIENT
Start: 2023-11-30 | End: 2023-11-30 | Stop reason: HOSPADM

## 2023-11-30 RX ORDER — CALCIUM CARBONATE 500 MG/1
1000 TABLET, CHEWABLE ORAL EVERY 6 HOURS PRN
Status: DISCONTINUED | OUTPATIENT
Start: 2023-11-30 | End: 2023-12-03 | Stop reason: HOSPADM

## 2023-11-30 RX ORDER — DIPHENOXYLATE HYDROCHLORIDE AND ATROPINE SULFATE 2.5; .025 MG/1; MG/1
1 TABLET ORAL 4 TIMES DAILY PRN
Status: DISCONTINUED | OUTPATIENT
Start: 2023-11-30 | End: 2023-12-03 | Stop reason: HOSPADM

## 2023-11-30 RX ORDER — CARBOPROST TROMETHAMINE 250 UG/ML
250 INJECTION, SOLUTION INTRAMUSCULAR
Status: DISCONTINUED | OUTPATIENT
Start: 2023-11-30 | End: 2023-12-03 | Stop reason: HOSPADM

## 2023-11-30 RX ORDER — CALCIUM CARBONATE 500 MG/1
500 TABLET, CHEWABLE ORAL PRN
Status: DISCONTINUED | OUTPATIENT
Start: 2023-11-30 | End: 2023-11-30

## 2023-11-30 RX ORDER — MISOPROSTOL 200 UG/1
600 TABLET ORAL
Status: DISCONTINUED | OUTPATIENT
Start: 2023-11-30 | End: 2023-12-03 | Stop reason: HOSPADM

## 2023-11-30 RX ORDER — SODIUM CHLORIDE, SODIUM LACTATE, POTASSIUM CHLORIDE, CALCIUM CHLORIDE 600; 310; 30; 20 MG/100ML; MG/100ML; MG/100ML; MG/100ML
INJECTION, SOLUTION INTRAVENOUS PRN
Status: DISCONTINUED | OUTPATIENT
Start: 2023-11-30 | End: 2023-12-03 | Stop reason: HOSPADM

## 2023-11-30 RX ORDER — DOCUSATE SODIUM 100 MG/1
100 CAPSULE, LIQUID FILLED ORAL 2 TIMES DAILY PRN
Status: DISCONTINUED | OUTPATIENT
Start: 2023-11-30 | End: 2023-12-03 | Stop reason: HOSPADM

## 2023-11-30 RX ORDER — LABETALOL HYDROCHLORIDE 5 MG/ML
INJECTION, SOLUTION INTRAVENOUS
Status: ACTIVE
Start: 2023-11-30 | End: 2023-11-30

## 2023-11-30 RX ADMIN — IBUPROFEN 800 MG: 800 TABLET, FILM COATED ORAL at 12:07

## 2023-11-30 RX ADMIN — NIFEDIPINE 10 MG: 10 CAPSULE ORAL at 03:47

## 2023-11-30 RX ADMIN — OXYTOCIN 125 ML/HR: 10 INJECTION, SOLUTION INTRAMUSCULAR; INTRAVENOUS at 02:55

## 2023-11-30 RX ADMIN — OXYCODONE 5 MG: 5 TABLET ORAL at 08:23

## 2023-11-30 RX ADMIN — LABETALOL HYDROCHLORIDE 40 MG: 5 INJECTION, SOLUTION INTRAVENOUS at 03:22

## 2023-11-30 RX ADMIN — CARBOPROST TROMETHAMINE 250 MCG: 250 INJECTION, SOLUTION INTRAMUSCULAR at 03:30

## 2023-11-30 RX ADMIN — LABETALOL HYDROCHLORIDE 20 MG: 5 INJECTION, SOLUTION INTRAVENOUS at 02:56

## 2023-11-30 RX ADMIN — MISOPROSTOL 800 MCG: 200 TABLET ORAL at 03:30

## 2023-11-30 RX ADMIN — ONDANSETRON 4 MG: 2 INJECTION INTRAMUSCULAR; INTRAVENOUS at 03:51

## 2023-11-30 RX ADMIN — ACETAMINOPHEN 1000 MG: 500 TABLET, FILM COATED ORAL at 03:47

## 2023-11-30 RX ADMIN — ACETAMINOPHEN 1000 MG: 500 TABLET ORAL at 18:40

## 2023-11-30 RX ADMIN — OXYTOCIN 20 UNITS: 10 INJECTION, SOLUTION INTRAMUSCULAR; INTRAVENOUS at 01:56

## 2023-11-30 RX ADMIN — IBUPROFEN 800 MG: 800 TABLET, FILM COATED ORAL at 03:47

## 2023-11-30 RX ADMIN — DIPHENOXYLATE HYDROCHLORIDE AND ATROPINE SULFATE 1 TABLET: 2.5; .025 TABLET ORAL at 03:47

## 2023-11-30 RX ADMIN — ACETAMINOPHEN 1000 MG: 500 TABLET ORAL at 10:25

## 2023-11-30 RX ADMIN — OXYCODONE 5 MG: 5 TABLET ORAL at 15:15

## 2023-11-30 RX ADMIN — ANTACID TABLETS 1000 MG: 500 TABLET, CHEWABLE ORAL at 08:10

## 2023-11-30 RX ADMIN — TRANEXAMIC ACID 1000 MG: 100 INJECTION, SOLUTION INTRAVENOUS at 03:32

## 2023-11-30 ASSESSMENT — PAIN SCALES - GENERAL: PAIN_LEVEL: 0

## 2023-11-30 ASSESSMENT — PAIN DESCRIPTION - PAIN TYPE
TYPE: ACUTE PAIN
TYPE: ACUTE PAIN

## 2023-11-30 NOTE — ANESTHESIA PREPROCEDURE EVALUATION
Date: 23  Procedure: Labor Epidural        li pregnancy at 39+6 weeks gestation.    Relevant Problems   No relevant active problems       Physical Exam    Airway   Mallampati: II  TM distance: >3 FB  Neck ROM: full       Cardiovascular - normal exam  Rhythm: regular  Rate: normal  (-) murmur     Dental - normal exam           Pulmonary - normal exam  Breath sounds clear to auscultation     Abdominal    Neurological - normal exam                   Anesthesia Plan    ASA 2       Plan - epidural   Neuraxial block will be labor analgesia                  Pertinent diagnostic labs and testing reviewed    Informed Consent:    Anesthetic plan and risks discussed with patient.

## 2023-11-30 NOTE — PROGRESS NOTES
Following delivery this morning the patient did have postpartum hemorrhage.  And so medications were ordered, namely Hemabate and tranexamic acid.  The patient's bleeding subsequently resolved.  When I was informed that she was experiencing postpartum hemorrhage I ordered these after mentioned medications and also ordered labs.  I ordered a CBC and PT, PTT, and INR.  Her CBC from this morning is not yet available.  Her INR from this morning at about 4:30 AM is available and is normal at 1.01 and her PTT from this morning at about 430 this morning was 13.4 seconds which is normal and her PTT from this morning at 430 this morning was also normal at 27.4 seconds and her fibrinogen from this morning is also normal at 430 mg/dL.  Her blood pressure very early this morning was 125/65 and then later this morning her blood pressure was 140/89.  Dimitris Giles MD

## 2023-11-30 NOTE — PROGRESS NOTES
181 Pt to room 215.   38 y.o.  @ 39.6 sent over by Dr. Giles with elevated BP's and 4 cm dilated for IOL. EFM & Felida applied. Initial /82 Repeat 150/76  IV started PIH labs sent.     Pt denies h/a, vision changes, epigastric pain. Reports mild swelling to feet. DTR wnl.     1900 Report to Constance PIEDRA RN.

## 2023-11-30 NOTE — CARE PLAN
The patient is Stable - Low risk of patient condition declining or worsening    Shift Goals  Clinical Goals: stable VS and lochia WNL  Patient Goals: rest and care for baby  Family Goals: provide emotional support    Progress made toward(s) clinical / shift goals:  Patient will ask for pain medication when needed.  Patient has scant to light lochia with a firm palpable uterus.  Vital signs are within defined limits.  Assessment will continue.     Patient is not progressing towards the following goals:

## 2023-11-30 NOTE — ANESTHESIA POSTPROCEDURE EVALUATION
Patient: Ally Dickerson    Procedure Summary       Date: 11/29/23 Room / Location:     Anesthesia Start: 2140 Anesthesia Stop: 11/30/23 0151    Procedure: Labor Epidural Diagnosis:     Scheduled Providers:  Responsible Provider: Troy Collins M.D.    Anesthesia Type: epidural ASA Status: 2            Final Anesthesia Type: epidural  Last vitals  BP   Blood Pressure: (!) 140/89 (Rn present )    Temp   36.6 °C (97.8 °F)    Pulse   93   Resp   18    SpO2   97 %      Anesthesia Post Evaluation    Patient location during evaluation: floor  Patient participation: complete - patient participated  Level of consciousness: awake and alert  Pain score: 0    Airway patency: patent  Anesthetic complications: no  Cardiovascular status: hemodynamically stable  Respiratory status: acceptable  Hydration status: euvolemic    PONV: none          No notable events documented.     Nurse Pain Score: 0 (NPRS)

## 2023-11-30 NOTE — DISCHARGE PLANNING
Discharge Planning Assessment Post Partum     Reason for Referral: History of depression and anxiety.  MOB is prescribed hydrocodone for PCOS and endometriosis.  Address: [address] [place] [address] [place] [address]  Phone: [phone]  Type of Living Situation: living with FOB and daughter  Mom Diagnosis: Pregnancy, vaginal delivery, PCOS, endometriosis  Baby Diagnosis: [place]-40 weeks  Primary Language: English     Name of Baby: Bev Jordan (: 23)  Father of the Baby: Maurilio Jordan   Involved in baby’s care? Yes  Contact Information: [phone]     Prenatal Care: Yes-Dr. Giles   Mom's PCP: Dr. Cameron   PCP for new baby: Pediatrician list provided      Support System: FOB   Coping/Bonding between mother & baby: Yes  Source of Feeding: formula   Supplies for Infant: prepared for infant; denies any needs     Mom's Insurance: Medicaid HPN  Baby Covered on Insurance:Yes  Mother Employed/School: Not currently   Other children in the home/names & ages: 5 year old daughter: Barbara Jordan (: 10/5/18)     Financial Hardship/Income: No   Mom's Mental status: alert and oriented  Services used prior to admit: Medicaid      CPS History: No  Psychiatric History: history of depression and anxiety.  Discussed with mother who denies any current/recent symptoms and explained that she was having depression and anxiety when she originally was told that she was not able to have children.  MOB stated she always wanted to be a mother and was always told that she could not have children due to medical issues.  Once ALLEN was able to get pregnant, she was so happy and denies any recent symptoms.  Domestic Violence History: No  Drug/ETOH History: No.  ALLEN is prescribed hydrocodone for PCOS and pain.  MOB explained that she really cut back her usage and last used on , 10/31/23.  Infant's last Lorna scores are 4 and 3.       Resources Provided: pediatrician list, children and family resource list, post  partum support and counseling resources, and diaper bank assistance resources   Referrals Made: diaper bank referral provided       Clearance for Discharge: Infant is cleared to discharge home with parents once medically cleared

## 2023-11-30 NOTE — L&D DELIVERY NOTE
Normal spontaneous vaginal delivery at 40 weeks and 0 days gestation at 1:51 AM today, .  Live term female .  Apgar scores 8 and 9 at 1 and 5 minutes respectively.   weight not yet measured but estimated to be approximately 3 kg.  Baby was delivered over an intact perineum under continuous epidural anesthesia.  The placenta was simply delivered and examined and found to be complete.  Next examination of the vulva and vaginal mucosa revealed abrasions and revealed a small superficial midline posterior vaginal wall laceration.  This laceration was repaired with a continuous interlocking suture of 3-0 chromic.  A bimanual vaginal exam was then performed and revealed that the uterus was firm and that there were no gauze sponges in the vagina.  A digital rectal exam was then performed and revealed that the anal sphincter was circumferentially intact and that the rectal mucosa was intact and that there were no sutures in the rectum.  The estimated blood loss was approximately 300 cc.  Lap count was found to be correct.  Dimitris Giles MD

## 2023-11-30 NOTE — PROGRESS NOTES
1900 report received from April RENATA Giles MD at  - orders received for pitocin 2 mu/units every 30 mins     Chan MIGUEL notified of Bps --  180/91 @   175 @   Orders received for 10 mg PO nifedipine IR now    2140 anesthesia MD at  for epidural procedure, placed without complication and pt tolerated well    0151  of viable female infant, 8/9 apgars    0155  of intact placenta, fundus firm, 1 above umbilicus, lochia light    0242 Chan MIGUEL notified of Bps --  176/85 @ 0228  166/81 @ 0242  Orders recevied to initiate antihypertensive protocol starting with labetalol    0309 Chan MIGUEL notified of bleeding --  Additional 110 mL + 185 mL weighed on pads  Orders received for stat fibrinogen, aPPT/INR, & PT  1000 mg TXA IV, 800 rectal cytotec, IM hemabate    0500 pt up to bathroom, able to avoid     0550 pt transferred via wheelchair with infant in arms to  room [id number]    7552 report given to PP RENATA Aldrdige

## 2023-11-30 NOTE — ANESTHESIA TIME REPORT
Anesthesia Start and Stop Event Times       Date Time Event    11/29/2023 2139 Ready for Procedure     2140 Anesthesia Start    11/30/2023 0151 Anesthesia Stop          Responsible Staff  11/29/23 to 11/30/23      Name Role Begin End    Troy Collins M.D. Anesth 2140 0151          Overtime Reason:  no overtime (within assigned shift)    Comments:

## 2023-11-30 NOTE — L&D DELIVERY NOTE
DATE OF SERVICE:  11/30/2023     The patient is a very pleasant 38-year-old (on admission para 1 with 1 previous vaginal delivery), who was admitted to [facility] Labor and Delivery on Wednesday, 11/29/2023 (yesterday) and was admitted in the evening and was admitted for induction of labor at 39 weeks and 6 days' gestation (induction of labor for pregnancy-induced hypertension versus preeclampsia).  I had seen her in the office earlier that day, namely in the afternoon yesterday, Wednesday, 11/29/2023, and I saw her in the office for return OB visit and nonstress test and the nonstress test, which was performed because of advanced maternal age, was reactive and her blood pressure in the office was found to be 146/90.  Also, in the office, urine dipstick revealed 2+ proteinuria.  It was suspected that she might have preeclampsia and so I advised her to report to labor and delivery for induction of labor because of preeclampsia at term.     So, initially in labor and delivery, her blood pressures were elevated with blood pressures in the 150s systolic over 70s-80s diastolic.  Preeclampsia labs were ordered and her platelet count was normal at 295,000 and her liver transaminases were not abnormally elevated (the AST was 11 units per liter and the ALT was 12 units per liter) and her protein/creatinine ratio was abnormally elevated at 1000 mg/g.  Of note, when I checked her cervix in the office yesterday afternoon, I found her cervix at that time to be about 4 cm dilated and 70% effaced and -2 station.  Her recent vaginal culture for group B strep came back negative for group B strep, and so after she was admitted to labor and delivery, Pitocin was started.  The fetal heart tracing was category 1 and continued to be category 1.  She received a labor epidural later in the yesterday evening.  Her labor epidural functioned well.  Spontaneous rupture of membranes then occurred sometime in the late evening  or very early this morning.     Very early this morning at about 1:30 a.m. (now 2023), her cervix was found to be completely dilated and she began pushing and did not have to push for very long before delivering.  She began pushing at 1:42 a.m. and then it was at 1:51 a.m. today, Thursday, 2023, that she went on to have a normal spontaneous vaginal delivery, at 40 weeks and 0 days' gestation, and was delivered of a live term female  with Apgar scores of 8 and 9 at 1 and 5 minutes respectively and a  weight of 3,540 grams.  Baby was delivered over an intact perineum under continuous epidural anesthesia.  The placenta was simply delivered and examined and found to be complete.  Next, examination of the vulva and vaginal mucosa revealed abrasions and revealed a small superficial midline posterior vaginal wall laceration.  This laceration was repaired with a continuous interlocking suture of 3-0 chromic.  Then, a bimanual vaginal exam was performed and revealed that the uterus was firm and that there were no gauze, sponges in the vagina.  Then, a digital rectal exam was performed and revealed that the anal sphincter was circumferentially intact and that the rectal mucosa was intact and that there were no sutures in the rectum.  Estimated blood loss was approximately 300 mL.  Lap count was found to be correct.        ______________________________  Dimitris Giles MD    MED/NIS    DD:  2023 04:41  DT:  2023 05:31    Job#:  [id number]

## 2023-11-30 NOTE — H&P
Ally Dickerson  YOB: 1985  Date of today's admission: Wednesday, November 29, 2023  Facility: [facility] Labor [facility]    ID: The patient is a very pleasant 38-year-old primipara (para-1 with 1 previous vaginal delivery) who is today 39 weeks and 6 days gestation.    Chief complaint: The patient has no complains other than for generalized discomfort consistent with term pregnancy.    History of present illness:  The patient has had prenatal care with myself during the course of this pregnancy. She has also been seen during the course of this pregnancy by perinatology, namely [facility]. Her pregnancy has been complicated by advanced maternal age and also cigarettes smoking. I saw her earlier today, in the afternoon, in the office for a return OB visit and nonstress test and this nonstress test in the afternoon today was reactive. I checked her cervix this afternoon and at that time  cervix to be about to centimeters dilated and 70 percent effaced and -2 station. Of note her blood pressure this afternoon in the office was 146/90 and urine dipstick did reveal +2 proteinuria. She denies any symptoms of preeclampsia. Because of possible pregnancy induced hypertension and possible preeclampsia at term she was advised to go to Labor & Delivery and so she presented to Labor & Delivery this evening.    Past medical history:  The patient has no medical illnesses.    Past surgical history: The patient has had no previous surgeries.    Medications:  Monisha patient takes no medications other than for prenatal vitamins.    Allergies: The patient says that she has no known drug allergies.    Social history: The patient smokes cigarettes. She denies consuming alcoholic beverages and she denies the use of recreational drugs.    Review of Systems:    General: The patient denies any fevers or chills or sweats.   Pulmonary: The patient denies any coughing or wheezing  or chest pain or shortness of breath.  Cardiovascular: The patient denies any palpitations, chest pain, dyspnea.  Gastrointestinal: The patient denies any nausea, vomiting, diarrhea, constipation, hematochezia, melena.  Genitourinary: The patient says she continues to feel fetal movement throughout the day every day and denies any frequent painful uterine contractions and denies any vaginal bleeding and denies any leakage of fluid per vagina.  Musculoskeletal: The patient denies any arthralgias or myalgias other than for hip pain and low back pain.  Neurological: The patient denies any headaches or syncope or seizures.      Physical Exam:    Vital signs: The patient's blood pressures are in the 150's systolic over 80's diastolic.  General: The patient appears well developed and well-nourished and relaxed and alert and comfortable and in no apparent distress.  HEENT: Normocephalic, atraumatic, pupils equal, round, reactive to light and accommodation, extra ocular motions intact, pharynx clear.  There is no thyromegaly.  There is no cervical lymphadenopathy.  Chest: Heart regular rate and rhythm, with no murmurs or rubs or gallops.  The lungs are clear to auscultation bilaterally.  Abdomen: Examination of the patient's abdomen with the patient in the dorsal supine position reveals that the abdomen is gravid and the fundal height measurement is to centimeters.  Digital cervical exam: Digital cervical exam this afternoon revealed that the cervix is four centimeters dilated and 70 percent effaced and -2 station.   Extremities: No clubbing or cyanosis or edema except for perhaps mild bilateral symmetrical lower extremity edema consistent with term pregnancy.  Neurological: Nonfocal.        Assessment:   1.) Intrauterine pregnancy 39 weeks and 6 days gestation.  2.) Possible preeclampsia versus pregnancy induced hypertension.  3.) Advanced maternal age.  4.) The patient smokes cigarettes.    Plan:   The patient has been  admitted and the fetal heart tracing is category one and we will continue electronic fetal monitoring and orders have been given to start Pitocin and arrangements will be made for the patient to receive a labor epidural and we will anticipate an obstetrical delivery.        __________________  Dimitris Giles M.D.

## 2023-11-30 NOTE — PROGRESS NOTES
Patient transferred from labor and delivery in wheelchair with infant in arms Two RN verification of infant armbands.Patient oriented to unit, call light, and POC. Assessment completed, fundus firm and palpable, lochia light rubra. Patient has already voided and is doing so without problems. Pain management discussed with pt. IV patent, no s/s of infiltration at insertion site. Patient encouraged to call with needs

## 2023-12-01 LAB
ERYTHROCYTE [DISTWIDTH] IN BLOOD BY AUTOMATED COUNT: 43.3 FL (ref 35.9–50)
HCT VFR BLD AUTO: 31.6 % (ref 37–47)
HGB BLD-MCNC: 10.1 G/DL (ref 12–16)
MCH RBC QN AUTO: 28.4 PG (ref 27–33)
MCHC RBC AUTO-ENTMCNC: 32 G/DL (ref 32.2–35.5)
MCV RBC AUTO: 88.8 FL (ref 81.4–97.8)
PLATELET # BLD AUTO: 220 K/UL (ref 164–446)
PMV BLD AUTO: 10.2 FL (ref 9–12.9)
RBC # BLD AUTO: 3.56 M/UL (ref 4.2–5.4)
WBC # BLD AUTO: 9.7 K/UL (ref 4.8–10.8)

## 2023-12-01 PROCEDURE — 700102 HCHG RX REV CODE 250 W/ 637 OVERRIDE(OP): Performed by: SPECIALIST

## 2023-12-01 PROCEDURE — 85027 COMPLETE CBC AUTOMATED: CPT

## 2023-12-01 PROCEDURE — 770002 HCHG ROOM/CARE - OB PRIVATE (112)

## 2023-12-01 PROCEDURE — A9270 NON-COVERED ITEM OR SERVICE: HCPCS | Performed by: SPECIALIST

## 2023-12-01 PROCEDURE — 36415 COLL VENOUS BLD VENIPUNCTURE: CPT

## 2023-12-01 RX ORDER — NIFEDIPINE 10 MG/1
10 CAPSULE ORAL ONCE
Status: COMPLETED | OUTPATIENT
Start: 2023-12-01 | End: 2023-12-01

## 2023-12-01 RX ORDER — LABETALOL 100 MG/1
100 TABLET, FILM COATED ORAL TWICE DAILY
Status: DISCONTINUED | OUTPATIENT
Start: 2023-12-01 | End: 2023-12-02

## 2023-12-01 RX ADMIN — NIFEDIPINE 10 MG: 10 CAPSULE ORAL at 06:40

## 2023-12-01 RX ADMIN — ACETAMINOPHEN 1000 MG: 500 TABLET ORAL at 06:19

## 2023-12-01 RX ADMIN — ACETAMINOPHEN 1000 MG: 500 TABLET ORAL at 23:24

## 2023-12-01 RX ADMIN — IBUPROFEN 800 MG: 800 TABLET, FILM COATED ORAL at 23:25

## 2023-12-01 RX ADMIN — OXYCODONE 5 MG: 5 TABLET ORAL at 18:06

## 2023-12-01 RX ADMIN — LABETALOL HYDROCHLORIDE 100 MG: 100 TABLET, FILM COATED ORAL at 19:06

## 2023-12-01 RX ADMIN — IBUPROFEN 800 MG: 800 TABLET, FILM COATED ORAL at 06:19

## 2023-12-01 RX ADMIN — ACETAMINOPHEN 1000 MG: 500 TABLET ORAL at 12:02

## 2023-12-01 RX ADMIN — OXYCODONE 5 MG: 5 TABLET ORAL at 09:07

## 2023-12-01 RX ADMIN — IBUPROFEN 800 MG: 800 TABLET, FILM COATED ORAL at 14:54

## 2023-12-01 ASSESSMENT — EDINBURGH POSTNATAL DEPRESSION SCALE (EPDS)
I HAVE BEEN ANXIOUS OR WORRIED FOR NO GOOD REASON: NO, NOT AT ALL
I HAVE BEEN SO UNHAPPY THAT I HAVE HAD DIFFICULTY SLEEPING: NOT AT ALL
THE THOUGHT OF HARMING MYSELF HAS OCCURRED TO ME: NEVER
I HAVE BLAMED MYSELF UNNECESSARILY WHEN THINGS WENT WRONG: NOT VERY OFTEN
I HAVE FELT SCARED OR PANICKY FOR NO GOOD REASON: NO, NOT AT ALL
THINGS HAVE BEEN GETTING ON TOP OF ME: NO, MOST OF THE TIME I HAVE COPED QUITE WELL
I HAVE FELT SAD OR MISERABLE: NO, NOT AT ALL
I HAVE BEEN SO UNHAPPY THAT I HAVE BEEN CRYING: NO, NEVER
I HAVE LOOKED FORWARD WITH ENJOYMENT TO THINGS: AS MUCH AS I EVER DID
I HAVE BEEN ABLE TO LAUGH AND SEE THE FUNNY SIDE OF THINGS: AS MUCH AS I ALWAYS COULD

## 2023-12-01 ASSESSMENT — PAIN DESCRIPTION - PAIN TYPE
TYPE: ACUTE PAIN

## 2023-12-01 NOTE — PROGRESS NOTES
Latest BP of 151/98, informed Dr. Giles with order to give 10 mg of nifedipine PO. Orders made and carried out.

## 2023-12-01 NOTE — CARE PLAN
The patient is Stable - Low risk of patient condition declining or worsening    Shift Goals  Clinical Goals: rest, pain control, VSS  Patient Goals: rest and care for baby  Family Goals: provide emotional support    Progress made toward(s) clinical / shift goals:  remains clinically stable    Patient is not progressing towards the following goals:

## 2023-12-01 NOTE — CARE PLAN
The patient is Stable - Low risk of patient condition declining or worsening    Shift Goals  Clinical Goals: remain clinically stable  Patient Goals: rest and care for baby  Family Goals: provide emotional support    Progress made toward(s) clinical / shift goals:  Patient will ask for pain medication when needed.  Patient is free from signs and symptoms of respiratory distress at this time.  Assessment will continue.    Patient is not progressing towards the following goals:

## 2023-12-01 NOTE — PROGRESS NOTES
The patient is today postpartum day #1 status post term vaginal delivery.  She says that other than for some soreness she feels fine and has no problems or complaints other than for some anxiety.  She says she was informed this morning that baby cannot go home for 5 days because baby will need to be monitored for withdrawal symptoms.  She does not report any increased or heavy vaginal bleeding.  She did have some postpartum hemorrhage within 1 or 2 hours following delivery but after she was given Hemabate and tranexamic acid this resolved.  Vital signs: After the patient was informed that baby will have to stay in the hospital for 5 days for observation and monitoring for withdrawal symptoms she became anxious and her blood pressure was then checked and found to be elevated at 151/98.  However all of her other blood pressures postpartum this morning and yesterday evening were normal.  Her vital signs otherwise have been stable and she has been afebrile.  General: The patient appears well-developed and well-nourished and relaxed and alert and comfortable and in no apparent distress.  Labs: The patient's antepartum hemoglobin was 12.9 g/dL and her postpartum hemoglobin at about 5:00 this morning 10.1 g/dL.  Her white blood count this morning was normal at 9.7 and a platelet count this morning was normal at 220,000.  Assessment:  Postpartum day #1 status post term vaginal delivery.  Plan:  We will plan on discharge home tomorrow.  I did ask the nurse to give her nifedipine 10 mg orally this morning after her blood pressure was found to be 151/98.  I explained to her however that I think this 1 isolated elevation in her blood pressure was largely due to stress or anxiety because this blood pressure elevation occurred shortly after she was informed that baby would have to stay in the hospital for 5 days.  We will continue to monitor blood pressures.  Dimitris Giles MD

## 2023-12-01 NOTE — PROGRESS NOTES
Bedside report done, patient in bed socializing with visitors. Denies pain at this time. Plan of care on going.

## 2023-12-01 NOTE — PROGRESS NOTES
0900- patient assessment done.  Condition will continue to be monitored.     1840-  updated on patient's blood pressures.  Orders received.

## 2023-12-02 PROBLEM — O14.90 PREECLAMPSIA: Status: ACTIVE | Noted: 2023-12-02

## 2023-12-02 PROBLEM — O09.523 ADVANCED MATERNAL AGE IN MULTIGRAVIDA, THIRD TRIMESTER: Status: ACTIVE | Noted: 2023-12-02

## 2023-12-02 PROCEDURE — RXMED WILLOW AMBULATORY MEDICATION CHARGE: Performed by: SPECIALIST

## 2023-12-02 PROCEDURE — A9270 NON-COVERED ITEM OR SERVICE: HCPCS | Performed by: SPECIALIST

## 2023-12-02 PROCEDURE — 770002 HCHG ROOM/CARE - OB PRIVATE (112)

## 2023-12-02 PROCEDURE — 700102 HCHG RX REV CODE 250 W/ 637 OVERRIDE(OP): Performed by: SPECIALIST

## 2023-12-02 RX ORDER — LABETALOL 100 MG/1
100 TABLET, FILM COATED ORAL 2 TIMES DAILY
Qty: 60 TABLET | Refills: 0 | Status: SHIPPED | OUTPATIENT
Start: 2023-12-02

## 2023-12-02 RX ORDER — NIFEDIPINE 10 MG/1
10 CAPSULE ORAL ONCE
Status: COMPLETED | OUTPATIENT
Start: 2023-12-02 | End: 2023-12-02

## 2023-12-02 RX ORDER — NIFEDIPINE 30 MG/1
30 TABLET, EXTENDED RELEASE ORAL DAILY
Qty: 30 TABLET | Refills: 0 | Status: SHIPPED | OUTPATIENT
Start: 2023-12-02

## 2023-12-02 RX ORDER — IBUPROFEN 800 MG/1
800 TABLET ORAL EVERY 8 HOURS PRN
Qty: 30 TABLET | Refills: 0 | Status: SHIPPED | OUTPATIENT
Start: 2023-12-02

## 2023-12-02 RX ORDER — NICOTINE 21 MG/24HR
14 PATCH, TRANSDERMAL 24 HOURS TRANSDERMAL
Status: DISCONTINUED | OUTPATIENT
Start: 2023-12-02 | End: 2023-12-03 | Stop reason: HOSPADM

## 2023-12-02 RX ORDER — LABETALOL 100 MG/1
200 TABLET, FILM COATED ORAL TWICE DAILY
Status: DISCONTINUED | OUTPATIENT
Start: 2023-12-02 | End: 2023-12-03 | Stop reason: HOSPADM

## 2023-12-02 RX ORDER — NIFEDIPINE 10 MG/1
10 CAPSULE ORAL EVERY 8 HOURS
Status: DISCONTINUED | OUTPATIENT
Start: 2023-12-02 | End: 2023-12-03 | Stop reason: HOSPADM

## 2023-12-02 RX ADMIN — LABETALOL HYDROCHLORIDE 200 MG: 100 TABLET, FILM COATED ORAL at 17:16

## 2023-12-02 RX ADMIN — OXYCODONE 5 MG: 5 TABLET ORAL at 06:42

## 2023-12-02 RX ADMIN — ACETAMINOPHEN 1000 MG: 500 TABLET ORAL at 10:58

## 2023-12-02 RX ADMIN — LABETALOL HYDROCHLORIDE 100 MG: 100 TABLET, FILM COATED ORAL at 05:36

## 2023-12-02 RX ADMIN — OXYCODONE 5 MG: 5 TABLET ORAL at 15:06

## 2023-12-02 RX ADMIN — OXYCODONE 5 MG: 5 TABLET ORAL at 21:06

## 2023-12-02 RX ADMIN — IBUPROFEN 800 MG: 800 TABLET, FILM COATED ORAL at 10:58

## 2023-12-02 RX ADMIN — NICOTINE 14 MG: 14 PATCH TRANSDERMAL at 17:35

## 2023-12-02 RX ADMIN — PRENATAL WITH FERROUS FUM AND FOLIC ACID 1 TABLET: 3080; 920; 120; 400; 22; 1.84; 3; 20; 10; 1; 12; 200; 27; 25; 2 TABLET ORAL at 08:31

## 2023-12-02 RX ADMIN — NIFEDIPINE 10 MG: 10 CAPSULE ORAL at 10:55

## 2023-12-02 RX ADMIN — OXYCODONE 5 MG: 5 TABLET ORAL at 02:02

## 2023-12-02 RX ADMIN — ACETAMINOPHEN 1000 MG: 500 TABLET ORAL at 05:36

## 2023-12-02 RX ADMIN — NIFEDIPINE 10 MG: 10 CAPSULE ORAL at 16:37

## 2023-12-02 RX ADMIN — DOCUSATE SODIUM 100 MG: 100 CAPSULE, LIQUID FILLED ORAL at 08:31

## 2023-12-02 RX ADMIN — ACETAMINOPHEN 1000 MG: 500 TABLET ORAL at 21:06

## 2023-12-02 ASSESSMENT — PAIN DESCRIPTION - PAIN TYPE
TYPE: ACUTE PAIN
TYPE: ACUTE PAIN
TYPE: ACUTE PAIN;SURGICAL PAIN
TYPE: ACUTE PAIN;SURGICAL PAIN

## 2023-12-02 NOTE — DISCHARGE INSTRUCTIONS
PATIENT DISCHARGE EDUCATION INSTRUCTION SHEET    REASONS TO CALL YOUR OBSTETRICIAN  Persistent fever, shaking, chills (Temperature higher than 100.4) may indicate you have an infection  Heavy bleeding: soaking more than 1 pad per hour; Passing clots an egg-sized clot or bigger may mean you have an postpartum hemorrhage  Foul odor from vagina or bad smelling or discolored discharge or blood  Breast infection (Mastitis symptoms); breast pain, chills, fever, redness or red streaks, may feel flu like symptoms  Urinary pain, burning or frequency  Redness, swelling, warmth, or painful to touch in the calf area of your leg may mean you have a blood clot  Severe or intensified depression, thoughts or feelings of wanting to hurt yourself or someone else   Pain in chest, obstructed breathing or shortness of breath (trouble catching your breath) may mean you are having a postpartum complication. Call your provider immediately   Headache that does not get better, even after taking medicine, a bad headache with vision changes or pain in the upper right area of your belly may mean you have high blood pressure or post birth preeclampsia. Call your provider immediately    HAND WASHING  All family and friends should wash their hands:  Before and after holding the baby  Before feeding the baby  After using the restroom or changing the baby's diaper    WOUND CARE  Ask your physician for additional care instructions. In general:  Episiotomy/Laceration  May use danie-spray bottle, witch hazel pads and dermaplast spray for comfort  Use danie-spray bottle after urinating to cleanse perineal area  To prevent burning during urination spray danie-water bottle on labial area   Pat perineal area dry until episiotomy/laceration is healed  Continue to use danie-bottle until bleeding stops as needed  If have a 2nd degree laceration or greater, a Sitz bath can offer relief from soreness, burning, and inflammation   Sitz Bath   Sit in 6 inches of warm  "water and soak laceration as needed until the laceration heals    VAGINAL CARE AND BLEEDING  Nothing inside vagina for 6 weeks:   No sexual intercourse, tampons or douching  Bleeding may continue for 2-4 weeks. Amount and color may vary  Soaking 1 pad or more in an hour for several hours is considered heavy bleeding  Passing large egg sized blood clots can be concerning  If you feel like you have heavy bleeding or are having increasing amount of blood clots call your Obstetrician immediately  If you begin feeling faint upon standing, feeling sick to your stomach, have clammy skin, a really fast heartbeat, have chills, start feeling confused, dizzy, sleepy or weak, or feeling like you're going to faint call your Obstetrician immediately    HYPERTENSION   Preeclampsia or gestational hypertension are types of high blood pressure that only pregnant women can get. It is important for you to be aware of symptoms to seek early intervention and treatment. If you have any of these symptoms immediately call your Obstetrician    Vision changes or blurred vision   Severe headache or pain that is unrelieved with medication and will not go away  Persistent pain in upper abdomen or shoulder   Increased swelling of face, feet, or hands  Difficulty breathing or shortness of breath at rest  Urinating less than usual    URINATION AND BOWEL MOVEMENTS  Eating more fiber (bran cereal, fruits, and vegetables) and drinking plenty of fluids will help to avoid constipation  Urinary frequency and urgency after childbirth is normal  If you experience any urinary pain, burning or frequency call your provider    BIRTH CONTROL  It is possible to become pregnant at any time after delivery and while breastfeeding  Plan to discuss a method of birth control with your physician at your post delivery follow up visit    POSTPARTUM BLUES  During the first few days after birth, you may experience a sense of the \"blues\" which may include impatience, " "irritability or even crying. These feelings come and go quickly. However, as many as 1 in 10 women experience emotional symptoms known as postpartum depression.     POSTPARTUM DEPRESSION    May start as early as the second or third day after delivery or take several weeks or months to develop. Symptoms of \"blues\" are present, but are more intense: Crying spells; loss of appetite; feelings of hopelessness or loss of control; fear of touching the baby; over concern or no concern at all about the baby; little or no concern about your own appearance/caring for yourself; and/or inability to sleep or excessive sleeping. Contact your Obstetrician if you are experiencing any of these symptoms     PREVENTING SHAKEN BABY  If you are angry or stressed, PUT THE BABY IN THE CRIB, step away, take some deep breaths, and wait until you are calm to care for the baby. DO NOT SHAKE THE BABY. You are not alone, call a supporter for help.  [facility] 24/7 crisis call line (738-371-6809) or (1-858.672.7479)  You can also text them, text \"ANSWER\" (553435)  "

## 2023-12-02 NOTE — CARE PLAN
The patient is Stable - Low risk of patient condition declining or worsening    Shift Goals  Clinical Goals: rest, pain control, VSS  Patient Goals: rest and care for baby  Family Goals: provide emotional support    Progress made toward(s) clinical / shift goals:  able to care for self and infant,   Problem: Knowledge Deficit - Postpartum  Goal: Patient will verbalize and demonstrate understanding of self and infant care  Outcome: Progressing     Problem: Psychosocial - Postpartum  Goal: Patient will verbalize and demonstrate effective bonding and parenting behavior  Outcome: Progressing       Patient is not progressing towards the following goals:

## 2023-12-02 NOTE — CARE PLAN
The patient is Stable - Low risk of patient condition declining or worsening    Shift Goals  Clinical Goals: Patient will maintain stable VS; Lochia WDL; Weinstein WDL  Patient Goals: rest and care for baby  Family Goals: provide emotional support    Progress made toward(s) clinical / shift goals:    Problem: Knowledge Deficit - L&D  Goal: Patient and family/caregivers will demonstrate understanding of plan of care, disease process/condition, diagnostic tests and medications  Outcome: Met     Problem: Risk for Excess Fluid Volume  Goal: Patient will demonstrate pulse, blood pressure and neurologic signs within expected ranges and without any respiratory complications  Outcome: Met     Problem: Pain - Standard  Goal: Alleviation of pain or a reduction in pain to the patient’s comfort goal  Outcome: Met     Problem: Knowledge Deficit - Standard  Goal: Patient and family/care givers will demonstrate understanding of plan of care, disease process/condition, diagnostic tests and medications  Outcome: Met     Problem: Knowledge Deficit - Postpartum  Goal: Patient will verbalize and demonstrate understanding of self and infant care  Outcome: Met     Problem: Psychosocial - Postpartum  Goal: Patient will verbalize and demonstrate effective bonding and parenting behavior  Outcome: Met     Problem: Altered Physiologic Condition  Goal: Patient physiologically stable as evidenced by normal lochia, palpable uterine involution and vitals within normal limits  Outcome: Met     Problem: Infection - Postpartum  Goal: Postpartum patient will be free of signs and symptoms of infection  Outcome: Met     Problem: Respiratory/Oxygenation Function Post-Surgical  Goal: Patient will achieve/maintain normal respiratory rate/effort  Outcome: Met     Problem: Bowel Elimination - Post Surgical  Goal: Patient will resume regular bowel sounds and function with no discomfort or distention  Outcome: Met     Problem: Early Mobilization - Post  Surgery  Goal: Early mobilization post surgery  Outcome: Met

## 2023-12-02 NOTE — PROGRESS NOTES
0830 Assessment completed. Lochia light, fundus firm. Plan of care reviewed. Declines pain intervention at this time, will call if pain intervention needed.     1040 Patient BP was 162/85, no symptoms of Pre-E, patient reports feeling tired and has increased pain. Reported to Dr. Giles, per Dr. Giles, 10mg of Nifedipine to be given now, Monitor throughout the day and if improved, patient may be able to go home, if not, patient likely to stay another night.     1620 TOMMY Sandoval reports BP was 172/93 HR 84. Patient has been calmly meditating for past hr. Reported results to Dr. Giles, Per Dr. Giles cancel discharge orders and start patient on Procardia 10mg TID, given next dose now and modify Labetalol to 200mg BID.

## 2023-12-02 NOTE — PROGRESS NOTES
The patient is today postpartum day #2 status post term vaginal delivery.  She tells me this morning that other than for some vaginal soreness and soreness of her abdominal muscles she feels fine and has no problems or complaints.  She says that her vaginal bleeding is minimal.  She tells me this morning that she would like to go home today.  Vital signs: The patient's blood pressure this morning was 141/82 and then earlier this morning her blood pressure was 141/81 and yesterday evening her blood pressure was 138/86 and earlier than that in the afternoon/evening yesterday her blood pressure was 144/100 and yesterday afternoon her blood pressure was 139/82 and yesterday morning her blood pressure was 147/88.  The patient's temperature this morning was 36.7 °C (98 °F) and the patient's heart rate this morning was 64 bpm and her respiratory rate this morning was 18 breaths/min.  The patient's heart rate this morning was 100 bpm and earlier this morning her heart rate was 96 bpm.  General: The patient appears well-developed and well-nourished and relaxed and alert and comfortable and in no apparent distress.  Assessment:  Postpartum day #2 status post term vaginal delivery.  Preeclampsia.  The patient's blood pressures have been somewhat elevated postpartum and yesterday she was started on labetalol 100 mg orally twice per day.  Plan:  The patient would like to go home today.  We will discharge her home today with prescriptions for ibuprofen and labetalol and I asked her to follow-up with me in the office in 6 weeks.  Additionally we will have the patient return to the office early next week for blood pressure check.  Dimitris Giles MD

## 2023-12-03 VITALS
OXYGEN SATURATION: 98 % | HEIGHT: 68 IN | BODY MASS INDEX: 31.52 KG/M2 | HEART RATE: 92 BPM | RESPIRATION RATE: 18 BRPM | WEIGHT: 208 LBS | TEMPERATURE: 98.7 F | DIASTOLIC BLOOD PRESSURE: 87 MMHG | SYSTOLIC BLOOD PRESSURE: 146 MMHG

## 2023-12-03 LAB
ALBUMIN SERPL BCP-MCNC: 3.5 G/DL (ref 3.2–4.9)
ALBUMIN/GLOB SERPL: 1.2 G/DL
ALP SERPL-CCNC: 79 U/L (ref 30–99)
ALT SERPL-CCNC: 14 U/L (ref 2–50)
ANION GAP SERPL CALC-SCNC: 11 MMOL/L (ref 7–16)
AST SERPL-CCNC: 14 U/L (ref 12–45)
BASOPHILS # BLD AUTO: 0.7 % (ref 0–1.8)
BASOPHILS # BLD: 0.07 K/UL (ref 0–0.12)
BILIRUB SERPL-MCNC: <0.2 MG/DL (ref 0.1–1.5)
BUN SERPL-MCNC: 15 MG/DL (ref 8–22)
CALCIUM ALBUM COR SERPL-MCNC: 9.5 MG/DL (ref 8.5–10.5)
CALCIUM SERPL-MCNC: 9.1 MG/DL (ref 8.5–10.5)
CHLORIDE SERPL-SCNC: 103 MMOL/L (ref 96–112)
CO2 SERPL-SCNC: 24 MMOL/L (ref 20–33)
CREAT SERPL-MCNC: 0.63 MG/DL (ref 0.5–1.4)
EOSINOPHIL # BLD AUTO: 0.22 K/UL (ref 0–0.51)
EOSINOPHIL NFR BLD: 2.3 % (ref 0–6.9)
ERYTHROCYTE [DISTWIDTH] IN BLOOD BY AUTOMATED COUNT: 42.5 FL (ref 35.9–50)
GFR SERPLBLD CREATININE-BSD FMLA CKD-EPI: 115 ML/MIN/1.73 M 2
GLOBULIN SER CALC-MCNC: 2.9 G/DL (ref 1.9–3.5)
GLUCOSE SERPL-MCNC: 87 MG/DL (ref 65–99)
HCT VFR BLD AUTO: 35 % (ref 37–47)
HGB BLD-MCNC: 11.7 G/DL (ref 12–16)
IMM GRANULOCYTES # BLD AUTO: 0.12 K/UL (ref 0–0.11)
IMM GRANULOCYTES NFR BLD AUTO: 1.2 % (ref 0–0.9)
LYMPHOCYTES # BLD AUTO: 2.98 K/UL (ref 1–4.8)
LYMPHOCYTES NFR BLD: 30.8 % (ref 22–41)
MCH RBC QN AUTO: 29 PG (ref 27–33)
MCHC RBC AUTO-ENTMCNC: 33.4 G/DL (ref 32.2–35.5)
MCV RBC AUTO: 86.6 FL (ref 81.4–97.8)
MONOCYTES # BLD AUTO: 0.8 K/UL (ref 0–0.85)
MONOCYTES NFR BLD AUTO: 8.3 % (ref 0–13.4)
NEUTROPHILS # BLD AUTO: 5.48 K/UL (ref 1.82–7.42)
NEUTROPHILS NFR BLD: 56.7 % (ref 44–72)
NRBC # BLD AUTO: 0 K/UL
NRBC BLD-RTO: 0 /100 WBC (ref 0–0.2)
PLATELET # BLD AUTO: 271 K/UL (ref 164–446)
PMV BLD AUTO: 9.7 FL (ref 9–12.9)
POTASSIUM SERPL-SCNC: 3.8 MMOL/L (ref 3.6–5.5)
PROT SERPL-MCNC: 6.4 G/DL (ref 6–8.2)
RBC # BLD AUTO: 4.04 M/UL (ref 4.2–5.4)
SODIUM SERPL-SCNC: 138 MMOL/L (ref 135–145)
WBC # BLD AUTO: 9.7 K/UL (ref 4.8–10.8)

## 2023-12-03 PROCEDURE — A9270 NON-COVERED ITEM OR SERVICE: HCPCS | Performed by: SPECIALIST

## 2023-12-03 PROCEDURE — 85025 COMPLETE CBC W/AUTO DIFF WBC: CPT

## 2023-12-03 PROCEDURE — 700102 HCHG RX REV CODE 250 W/ 637 OVERRIDE(OP): Performed by: SPECIALIST

## 2023-12-03 PROCEDURE — 80053 COMPREHEN METABOLIC PANEL: CPT

## 2023-12-03 PROCEDURE — 36415 COLL VENOUS BLD VENIPUNCTURE: CPT

## 2023-12-03 RX ADMIN — OXYCODONE 5 MG: 5 TABLET ORAL at 08:39

## 2023-12-03 RX ADMIN — PRENATAL WITH FERROUS FUM AND FOLIC ACID 1 TABLET: 3080; 920; 120; 400; 22; 1.84; 3; 20; 10; 1; 12; 200; 27; 25; 2 TABLET ORAL at 08:39

## 2023-12-03 RX ADMIN — NICOTINE 14 MG: 14 PATCH TRANSDERMAL at 08:39

## 2023-12-03 RX ADMIN — NIFEDIPINE 10 MG: 10 CAPSULE ORAL at 00:20

## 2023-12-03 RX ADMIN — ACETAMINOPHEN 1000 MG: 500 TABLET ORAL at 06:29

## 2023-12-03 RX ADMIN — DOCUSATE SODIUM 100 MG: 100 CAPSULE, LIQUID FILLED ORAL at 08:39

## 2023-12-03 RX ADMIN — LABETALOL HYDROCHLORIDE 200 MG: 100 TABLET, FILM COATED ORAL at 06:08

## 2023-12-03 RX ADMIN — IBUPROFEN 800 MG: 800 TABLET, FILM COATED ORAL at 00:27

## 2023-12-03 RX ADMIN — OXYCODONE 5 MG: 5 TABLET ORAL at 01:30

## 2023-12-03 RX ADMIN — NIFEDIPINE 10 MG: 10 CAPSULE ORAL at 08:39

## 2023-12-03 ASSESSMENT — PAIN DESCRIPTION - PAIN TYPE
TYPE: ACUTE PAIN

## 2023-12-03 NOTE — PROGRESS NOTES
0830 Assessment completed. Lochia light, fundus firm. Plan of care reviewed. Declines pain intervention at this time, will call if pain intervention needed.  1330 Discharge instructions and education reviewed with parents, verbalized understanding, papers signed. Identification bands verified. Infant placed in car seat by parents, checked by RN. Left facility escorted by staff.

## 2023-12-03 NOTE — PROGRESS NOTES
The patient is today postpartum day #3 status post term vaginal delivery.  She was going to go home yesterday but some of her blood pressures were elevated and so the discharge orders were canceled.  Last night she did have a headache but she tells me this morning that she no longer has a headache.  She says she would like to go home today.  She says she was able to get some sleep yesterday and feels much better today after having gotten more sleep.  Vital signs: The patient's blood pressure this morning was 146/87.  Earlier this morning her blood pressure was 138/86.  Even earlier this morning her blood pressure was 134/85.  The patient's temperature this morning was 37.1 °C (98.7 °F.  Her temperature earlier this morning was 37 °C (98.6 °F.  Even earlier this morning her temperature was 37.2 °C (99 °F).  Her heart rate this morning was 92 bpm and her respiratory rate this morning was 18 breaths/min and her pulse oximetry this morning was 98% on room air.  General: The patient appears well-developed and well-nourished and relaxed and alert and comfortable and in no apparent distress.  Labs: The patient's white blood count this morning was normal at 9.7 and her hemoglobin and hematocrit this morning were 11.7 g/dL and 35.0% respectively.  Her platelet count this morning was 271,000.  Her electrolytes this morning were normal.  Her BUN and creatinine this morning were 15 mg/dL and 0.63 mg/dL respectively.  Her liver transaminases this morning were normal with an AST (SGOT) of 14 units/L and an ALT (SGPT of 14 units/L.  Assessment:  The patient is today postpartum day #3 status post term vaginal delivery.  Preeclampsia both antepartum and postpartum and the patient's blood pressures seem to be reasonably controlled with labetalol and nifedipine.  Her preeclampsia labs this morning are normal.  The patient would like to go home today.  Plan:  We will discharge the patient home today with prescriptions for labetalol and  nifedipine.  I asked her to call the office tomorrow to set up an appointment to see me sometime this week for blood pressure check and I asked her to call or contact me at any time should she ever have any problems or questions or complaints and she said that she would do so.  Dimitris Giles MD

## 2023-12-03 NOTE — PROGRESS NOTES
Dr. Giles notified at this time of patient's last 2 blood pressures. Orders received to change blood pressure parameters for notifying provider and to give ordered Oxy IR now for her c/o headache as she cannot have tylenol or ibuprofen yet.

## 2023-12-03 NOTE — CARE PLAN
The patient is Stable - Low risk of patient condition declining or worsening    Shift Goals  Clinical Goals: Patient BP WDL; Lochia WDL  Patient Goals: pain control,rest  Family Goals: rest,formula feed    Progress made toward(s) clinical / shift goals:    Problem: Knowledge Deficit - Postpartum  Goal: Patient will verbalize and demonstrate understanding of self and infant care  Outcome: Met     Problem: Psychosocial - Postpartum  Goal: Patient will verbalize and demonstrate effective bonding and parenting behavior  Outcome: Met     Problem: Altered Physiologic Condition  Goal: Patient physiologically stable as evidenced by normal lochia, palpable uterine involution and vitals within normal limits  Outcome: Met     Problem: Infection - Postpartum  Goal: Postpartum patient will be free of signs and symptoms of infection  Outcome: Met

## 2023-12-03 NOTE — PROGRESS NOTES
Fundus firm with scant lochia. Patient continues to require pain meds for cramping and lower back pain. Warm packs also being given.BPs being monitored along with BP medications being given.

## 2023-12-10 NOTE — DISCHARGE SUMMARY
Date of admission:  2023.  Date of discharge:  De, December 3, 2023.  Admission diagnoses:  1.) Intrauterine pregnancy at 39 weeks and 6 days gestation.  2.) Possible preeclampsia versus pregnancy induced hypertension.  3.) Advanced maternal age.  4.) The patient smokes cigarettes.  Discharge diagnoses:  1.) Term intrauterine pregnancy (39 weeks and 6 days gestation on admission and 40 weeks and 0 days gestation at delivery).  2.) Preeclampsia.  3.) Advanced maternal age.  4.) The patient smokes cigarettes.  5.) Live term male  delivered at 40 weeks and 0 days gestation with Apgar scores of 8 and 9 at one and five minutes respectively and a  weight of 3,540 grams.  Procedures:  Normal spontaneous vaginal delivery.  Hospital course:  The patient had prenatal care with my sultry the course of her pregnancy. She was also seen during the course of her pregnancy by perinatology (namely [facility]). Her pregnancy was complicated by advanced maternal age and also by the fact that she smokes cigarettes. She was admitted to Labor & Delivery on 2023 and was admitted in the evening and was admitted for induction of labor 39 weeks and 6 days gestation (induction of labor because of preeclampsia versus pregnancy induced hypertension). She had been seen in the office earlier that day and one seen in the office of blood pressure (on 2023) was found to be 146/90 adherent epic revealed 2+ proteinuria. She was also seen in the office on that day for nonstress test and this nonstress test was reactive. I explained to her in the office that because her blood pressure was elevated at 146/90 and her urine dipstick revealed 2+ proteinuria and because she was term that I recommended proceeding with induction of labor and she agreed and so presented to Labor & Delivery in the evening for admission for induction of labor. And so,  initially in Labor & Delivery her blood pressures were found to be elevated with systolic blood pressures in the 150s and diastolic blood pressures in the 70s to 80s. Preeclampsia labs were ordered and her platelet count was normal at 295,000 and her liver transaminases were not abnormally elevated (the AST was 11 units per liter and the ALT was 12 units per liter). Her protein/creatinine ratio was abnormally elevated at 1,000 milligrams per gram. When I had checked her cervix in the office I had  cervix to be about four centimeters dilated and 70 percent effaced and -2 station. It was noted that her recent vaginal culture for group B strep and come back negative for group B strep. And so, after she was ability to Labor & Delivery Pitocin was started per the fetal heart tracing was found to be category 1. The fetal heart tracing the need to be category 1. She received a labor epidural later in the evening of the day of admission. Her labor epidural function well. Spontaneous rupture of membranes subsequently occurred sometime later in the evening or very early in the morning on the next day. Then very early in the morning of the next day, , at about 1:30 AM, her cervix was found to be completely dilated and she began pushing and did not have to push for very long before delivering. She began pushing at 1:42 AM (). It was at 1:51 AM,  that she went on to have a normal spontaneous vaginal delivery, at 40 weeks and 0 days gestation, and she was delivered of a live term female  with Apgar scores of 8 and 9 at one and five minutes respectively and a  weight of 3,540 grams. Baby was delivered over and intact perineum under continuous epidural anesthesia. The placenta was simply delivered and examined and found to be complete protect examination of the vulva and vaginal mucosa revealed abrasions and revealed a small  superficial midline posterior vaginal wall laceration and this laceration was repaired with a continuous interlocking suture of 3-0 chromic. Then, a bimanual vaginal exam was performed and revealed that the uterus was firm and that there were no gospel inches and the vagina. Then, a digital rectal exam was performed and revealed that the anal sphincter was circumferentially intact and that the rectal mucosa was intact and that there were no sutures in the rectum. The estimated blood loss was a proximally 300 cc. Lap count was found to be correct.  Later on that morning, following delivery, the patient did have postpartum hemorrhage. And so medications were ordered, namely Hemabate and tranexamic acid. Then the patient's bleeding subsequently resolved. When I was informed that she was experiencing postpartum hemorrhage I ordered these aforementioned medications and I also ordered labs. I ordered a CBC and PT and PTT and INR. Her INR at about 4:30 AM was 1.01 and her PTT that morning at about 4:30 AM was 13.4 seconds which was normal and her PTT from that morning at about 4:30 AM was 27.4 seconds and her fibrinogen from that morning at about 4:30 AM was normal at 430 milligrams per deciliter. Her blood pressure very early in the morning was 125/65 and later her blood pressure was 140/89.  The next day, Friday, December 1, 2023, postpartum day number one, the patient told me that other than for some soreness she was feeling fine and has no problems or complaints other than for some anxiety. She said she was having some anxiety because she had been informed earlier that morning (Friday, December 1, 2023) that her baby was not going to be discharged home on that day and in fact her baby was point need to stay in the hospital for about five days to be monitored for withdrawal symptoms. That morning (Friday, December 1, 2023, postpartum day number one) she did not report any increased or heavy vaginal bleeding. Her vital  signs revealed an elevated blood pressure 151/98 and his blood pressure was taken soon after she was informed that baby was going to have to stay in the hospital for five days and this elevated blood pressure was certainly at least do in part to anxiety resulting from her being form that her baby would have to stay in the hospital for five days. However, all of her other blood pressures postpartum that morning and in fact the evening of the previous day had been normal. Her vital signs were stable otherwise and she was afebrile. She appears well developed and well-nourished and relaxed and alert and comfortable and in no apparent distress. Her postpartum hemoglobin that morning was 12.9 grams per deciliter and her subsequent postpartum hemoglobin at 5 o'clock that morning was 10.1 grams per deciliter. Her white blood count that morning was normal at 9.7 and are platelet count was normal at 220,000.  The next day, Saturday, December 2, 2023, postpartum day number two the patient told me that other than for some vaginal soreness and soreness of her abdominal muscles she felt fine and has no problems or complaints pay she told me that morning that her vaginal bleeding was minimal. She told me that morning that she wanted to go home and that day. Her vital signs from that morning revealed a blood pressure 141/82 and then earlier that morning her blood pressure had been 141/81 was noted that in the evening of the previous day her blood pressure had been 138/86. She was afebrile. She appeared well developed and well-nourished and relaxed and alert and comfortable and in no apparent distress. Because her blood pressures have been somewhat elevated she had been started on labetalol 100 milligrams orally twice per day. She wanted to go home and that day and discharge orders were given and I sent in prescriptions for labetalol and high-dose ibuprofen and asked her to follow up with me and six weeks for postpartum  visit.  However, later on these discharge orders were canceled because some of her blood pressures were found be more elevated. And so the next day, De, December 3, 2023, postpartum day number three, her blood pressures were not as elevated. She told me on postpartum day number three that the previous night she had experienced a headache within this headache resolved and when I saw her in the morning of postpartum day number 3 she was no longer having headache and she said she wanted to go home and that day. Her blood pressure in the morning that day was 146/87 and earlier in the morning it had been 138/86 and even earlier in the morning her blood pressure had been 134/85. She was afebrile in her heart rate was 92 beats per minute and respiratory rate was 18 breaths permitted and her pulse oximetry was 98 percent on room air. She appears well developed and well-nourished and relaxed and alert and comfortable and in no apparent distress. Her white blood count from that morning was 9.7 enter hemoglobin and hematocrit from that morning were 11.7 grams per deciliter and 35.0 percent respectively and are platelet count that morning was 271,000. Her electrolytes that morning were normal and her BUN and creatinine from that morning were normal and her liver transaminases from that morning were normal. She was discharged home on that day, De, December 3, 2023, postpartum day number three and was discharged home with prescriptions for labetalol and nifedipine and I asked her to follow up with me in the office in a few days for blood pressure check and to call or contact me at any time should she ever have any problems or questions or complaints and she said that she would do so.  Dimitris Giles M.D.

## 2023-12-14 ENCOUNTER — PHARMACY VISIT (OUTPATIENT)
Dept: PHARMACY | Facility: MEDICAL CENTER | Age: 38
End: 2023-12-14
Payer: COMMERCIAL

## 2023-12-27 DIAGNOSIS — N80.9 ENDOMETRIOSIS: ICD-10-CM

## 2023-12-27 DIAGNOSIS — G89.4 CHRONIC PAIN SYNDROME: ICD-10-CM

## 2023-12-28 RX ORDER — HYDROCODONE BITARTRATE AND ACETAMINOPHEN 7.5; 325 MG/1; MG/1
1 TABLET ORAL EVERY 6 HOURS PRN
Qty: 120 TABLET | Refills: 0 | Status: SHIPPED | OUTPATIENT
Start: 2023-12-28 | End: 2024-01-26 | Stop reason: SDUPTHER

## 2024-01-26 DIAGNOSIS — G89.4 CHRONIC PAIN SYNDROME: ICD-10-CM

## 2024-01-26 DIAGNOSIS — N80.9 ENDOMETRIOSIS: ICD-10-CM

## 2024-01-26 NOTE — TELEPHONE ENCOUNTER
Received request via: Patient    Was the patient seen in the last year in this department? No, last seen 12/2022    Does the patient have an active prescription (recently filled or refills available) for medication(s) requested? No    [facility]    Does the patient have [facility] Plus and need 100 day supply (blood pressure, diabetes and cholesterol meds only)? Patient does not have SCP

## 2024-01-29 RX ORDER — HYDROCODONE BITARTRATE AND ACETAMINOPHEN 7.5; 325 MG/1; MG/1
1 TABLET ORAL EVERY 6 HOURS PRN
Qty: 120 TABLET | Refills: 0 | Status: SHIPPED | OUTPATIENT
Start: 2024-01-29 | End: 2024-02-22 | Stop reason: SDUPTHER

## 2024-02-03 NOTE — DISCHARGE INSTRUCTIONS
Abdominal Pain, Adult  Many things can cause belly (abdominal) pain. Most times, the belly pain is not dangerous. Many cases of belly pain can be watched and treated at home.  HOME CARE   · Do not take medicines that help you go poop (laxatives) unless told to by your doctor.  · Only take medicine as told by your doctor.  · Eat or drink as told by your doctor. Your doctor will tell you if you should be on a special diet.  GET HELP IF:  · You do not know what is causing your belly pain.  · You have belly pain while you are sick to your stomach (nauseous) or have runny poop (diarrhea).  · You have pain while you pee or poop.  · Your belly pain wakes you up at night.  · You have belly pain that gets worse or better when you eat.  · You have belly pain that gets worse when you eat fatty foods.  · You have a fever.  GET HELP RIGHT AWAY IF:   · The pain does not go away within 2 hours.  · You keep throwing up (vomiting).  · The pain changes and is only in the right or left part of the belly.  · You have bloody or tarry looking poop.  MAKE SURE YOU:   · Understand these instructions.  · Will watch your condition.  · Will get help right away if you are not doing well or get worse.     This information is not intended to replace advice given to you by your health care provider. Make sure you discuss any questions you have with your health care provider.     Document Released: 06/05/2009 Document Revised: 01/08/2016 Document Reviewed: 08/27/2014  [employer] Patient Education ©2016 [employer]    
No

## 2024-02-22 DIAGNOSIS — N80.9 ENDOMETRIOSIS: ICD-10-CM

## 2024-02-22 DIAGNOSIS — G89.4 CHRONIC PAIN SYNDROME: ICD-10-CM

## 2024-02-23 NOTE — TELEPHONE ENCOUNTER
Received request via: Patient    Was the patient seen in the last year in this department? No, last seen 12/2022    Does the patient have an active prescription (recently filled or refills available) for medication(s) requested? No    [facility]    Does the patient have [facility] and need 100 day supply (blood pressure, diabetes and cholesterol meds only)? Patient does not have SCP

## 2024-02-26 RX ORDER — HYDROCODONE BITARTRATE AND ACETAMINOPHEN 7.5; 325 MG/1; MG/1
1 TABLET ORAL EVERY 6 HOURS PRN
Qty: 120 TABLET | Refills: 0 | Status: SHIPPED | OUTPATIENT
Start: 2024-02-26 | End: 2024-03-23 | Stop reason: SDUPTHER

## 2024-03-23 DIAGNOSIS — G89.4 CHRONIC PAIN SYNDROME: ICD-10-CM

## 2024-03-23 DIAGNOSIS — N80.9 ENDOMETRIOSIS: ICD-10-CM

## 2024-03-26 RX ORDER — HYDROCODONE BITARTRATE AND ACETAMINOPHEN 7.5; 325 MG/1; MG/1
1 TABLET ORAL EVERY 6 HOURS PRN
Qty: 120 TABLET | Refills: 0 | Status: SHIPPED | OUTPATIENT
Start: 2024-03-26 | End: 2024-04-25

## 2024-04-22 DIAGNOSIS — G89.4 CHRONIC PAIN SYNDROME: ICD-10-CM

## 2024-04-22 DIAGNOSIS — N80.9 ENDOMETRIOSIS: ICD-10-CM

## 2024-04-22 NOTE — TELEPHONE ENCOUNTER
Received request via: Pharmacy    Was the patient seen in the last year in this department? No    Does the patient have an active prescription (recently filled or refills available) for medication(s) requested? No    Pharmacy Name: [facility]    Does the patient have [facility] Plus and need 100 day supply (blood pressure, diabetes and cholesterol meds only)? Patient does not have SCP

## 2024-04-23 RX ORDER — HYDROCODONE BITARTRATE AND ACETAMINOPHEN 7.5; 325 MG/1; MG/1
1 TABLET ORAL EVERY 6 HOURS PRN
Qty: 120 TABLET | Refills: 0 | Status: SHIPPED | OUTPATIENT
Start: 2024-04-23 | End: 2024-05-23

## 2024-05-20 DIAGNOSIS — G89.4 CHRONIC PAIN SYNDROME: ICD-10-CM

## 2024-05-20 DIAGNOSIS — N80.9 ENDOMETRIOSIS: ICD-10-CM

## 2024-05-20 NOTE — TELEPHONE ENCOUNTER
Patient's symptoms of coughing are likely related to heart failure, not allergy symptoms    Strongly recommend that the patient follow with Cardiology, not just have prescriptions for mucus/allergies Received request via: Pharmacy    Was the patient seen in the last year in this department? Yes    Does the patient have an active prescription (recently filled or refills available) for medication(s) requested? No    [facility]    Does the patient have [facility] Plus and need 100 day supply (blood pressure, diabetes and cholesterol meds only)? Patient does not have SCP

## 2024-05-21 RX ORDER — HYDROCODONE BITARTRATE AND ACETAMINOPHEN 7.5; 325 MG/1; MG/1
1 TABLET ORAL EVERY 6 HOURS PRN
Qty: 120 TABLET | Refills: 0 | Status: SHIPPED | OUTPATIENT
Start: 2024-05-21 | End: 2024-06-20

## 2024-06-17 DIAGNOSIS — G89.4 CHRONIC PAIN SYNDROME: ICD-10-CM

## 2024-06-17 DIAGNOSIS — N80.9 ENDOMETRIOSIS: ICD-10-CM

## 2024-06-17 NOTE — TELEPHONE ENCOUNTER
Received request via: Pharmacy    Was the patient seen in the last year in this department? No    Does the patient have an active prescription (recently filled or refills available) for medication(s) requested? No    Pharmacy Name: [facility] [place]    Does the patient have snf Plus and need 100 day supply (blood pressure, diabetes and cholesterol meds only)? Patient does not have SCP

## 2024-06-18 RX ORDER — HYDROCODONE BITARTRATE AND ACETAMINOPHEN 7.5; 325 MG/1; MG/1
1 TABLET ORAL EVERY 6 HOURS PRN
Qty: 120 TABLET | Refills: 0 | Status: SHIPPED | OUTPATIENT
Start: 2024-06-18 | End: 2024-07-18

## 2024-07-15 DIAGNOSIS — N80.9 ENDOMETRIOSIS: ICD-10-CM

## 2024-07-15 DIAGNOSIS — G89.4 CHRONIC PAIN SYNDROME: ICD-10-CM

## 2024-07-18 RX ORDER — HYDROCODONE BITARTRATE AND ACETAMINOPHEN 7.5; 325 MG/1; MG/1
1 TABLET ORAL EVERY 6 HOURS PRN
Qty: 120 TABLET | Refills: 0 | Status: SHIPPED | OUTPATIENT
Start: 2024-07-18 | End: 2024-08-17

## 2024-08-13 DIAGNOSIS — G89.4 CHRONIC PAIN SYNDROME: ICD-10-CM

## 2024-08-13 DIAGNOSIS — N80.9 ENDOMETRIOSIS: ICD-10-CM

## 2024-08-13 RX ORDER — HYDROCODONE BITARTRATE AND ACETAMINOPHEN 7.5; 325 MG/1; MG/1
1 TABLET ORAL EVERY 6 HOURS PRN
Qty: 120 TABLET | Refills: 0 | Status: SHIPPED | OUTPATIENT
Start: 2024-08-13 | End: 2024-08-19 | Stop reason: SDUPTHER

## 2024-08-13 NOTE — TELEPHONE ENCOUNTER
Received request via: Patient    Was the patient seen in the last year in this department? Yes    Does the patient have an active prescription (recently filled or refills available) for medication(s) requested? No    [facility]    Does the patient have long term Plus and need 100-day supply? (This applies to ALL medications) Patient does not have SCP

## 2024-08-19 DIAGNOSIS — N80.9 ENDOMETRIOSIS: ICD-10-CM

## 2024-08-19 DIAGNOSIS — G89.4 CHRONIC PAIN SYNDROME: ICD-10-CM

## 2024-08-19 RX ORDER — HYDROCODONE BITARTRATE AND ACETAMINOPHEN 7.5; 325 MG/1; MG/1
1 TABLET ORAL EVERY 6 HOURS PRN
Qty: 120 TABLET | Refills: 0 | Status: SHIPPED | OUTPATIENT
Start: 2024-08-19 | End: 2024-08-20 | Stop reason: SDUPTHER

## 2024-08-20 DIAGNOSIS — N80.9 ENDOMETRIOSIS: ICD-10-CM

## 2024-08-20 DIAGNOSIS — G89.4 CHRONIC PAIN SYNDROME: ICD-10-CM

## 2024-08-20 RX ORDER — HYDROCODONE BITARTRATE AND ACETAMINOPHEN 7.5; 325 MG/1; MG/1
1 TABLET ORAL EVERY 6 HOURS PRN
Qty: 120 TABLET | Refills: 0 | Status: SHIPPED | OUTPATIENT
Start: 2024-08-20 | End: 2024-09-19

## 2024-09-04 ENCOUNTER — APPOINTMENT (OUTPATIENT)
Dept: MEDICAL GROUP | Facility: CLINIC | Age: 39
End: 2024-09-04
Payer: MEDICAID

## 2024-09-04 VITALS
OXYGEN SATURATION: 99 % | DIASTOLIC BLOOD PRESSURE: 78 MMHG | HEART RATE: 91 BPM | BODY MASS INDEX: 30.39 KG/M2 | TEMPERATURE: 98.4 F | SYSTOLIC BLOOD PRESSURE: 116 MMHG | HEIGHT: 68 IN | WEIGHT: 200.5 LBS

## 2024-09-04 DIAGNOSIS — G89.4 CHRONIC PAIN SYNDROME: ICD-10-CM

## 2024-09-04 DIAGNOSIS — F17.200 TOBACCO DEPENDENCE SYNDROME: ICD-10-CM

## 2024-09-04 DIAGNOSIS — N80.9 ENDOMETRIOSIS: ICD-10-CM

## 2024-09-04 PROBLEM — O14.90 PREECLAMPSIA: Status: RESOLVED | Noted: 2023-12-02 | Resolved: 2024-09-04

## 2024-09-04 PROBLEM — O09.523 ADVANCED MATERNAL AGE IN MULTIGRAVIDA, THIRD TRIMESTER: Status: RESOLVED | Noted: 2023-12-02 | Resolved: 2024-09-04

## 2024-09-04 PROBLEM — L98.9 SKIN LESION: Status: RESOLVED | Noted: 2022-07-19 | Resolved: 2024-09-04

## 2024-09-04 PROCEDURE — 3074F SYST BP LT 130 MM HG: CPT | Performed by: FAMILY MEDICINE

## 2024-09-04 PROCEDURE — 99214 OFFICE O/P EST MOD 30 MIN: CPT | Performed by: FAMILY MEDICINE

## 2024-09-04 PROCEDURE — 3078F DIAST BP <80 MM HG: CPT | Performed by: FAMILY MEDICINE

## 2024-09-04 PROCEDURE — 1125F AMNT PAIN NOTED PAIN PRSNT: CPT | Performed by: FAMILY MEDICINE

## 2024-09-04 RX ORDER — HYDROCODONE BITARTRATE AND ACETAMINOPHEN 7.5; 325 MG/1; MG/1
1 TABLET ORAL EVERY 6 HOURS PRN
Qty: 120 TABLET | Refills: 0 | Status: SHIPPED | OUTPATIENT
Start: 2024-09-17 | End: 2024-10-17

## 2024-09-04 RX ORDER — HYDROCODONE BITARTRATE AND ACETAMINOPHEN 7.5; 325 MG/1; MG/1
1 TABLET ORAL EVERY 6 HOURS PRN
Qty: 120 TABLET | Refills: 0 | Status: SHIPPED | OUTPATIENT
Start: 2024-10-17 | End: 2024-11-16

## 2024-09-04 RX ORDER — HYDROCODONE BITARTRATE AND ACETAMINOPHEN 7.5; 325 MG/1; MG/1
1 TABLET ORAL EVERY 6 HOURS PRN
Qty: 120 TABLET | Refills: 0 | Status: SHIPPED | OUTPATIENT
Start: 2024-11-16 | End: 2024-12-16

## 2024-09-04 ASSESSMENT — FIBROSIS 4 INDEX: FIB4 SCORE: 0.57

## 2024-09-04 ASSESSMENT — PATIENT HEALTH QUESTIONNAIRE - PHQ9: CLINICAL INTERPRETATION OF PHQ2 SCORE: 0

## 2024-09-04 ASSESSMENT — PAIN SCALES - GENERAL: PAINLEVEL: 6=MODERATE PAIN

## 2024-09-04 NOTE — PROGRESS NOTES
Subjective:     CC: Controlled substances refill    HPI:   Ally presents today with the above problems.  She still has a fair amount of pelvic pain from her endometriosis.  She is set up for surgery but has been able to do that because of social situations.  He has not had any any exacerbation of pain but she states that her pain normally is 6 out of 10 intensity all the time.    She still is smoking.  She had stopped however is back to smoking approximately 1 pack/day.  We had a fairly long conversation about that is okay that it takes several times to quit before you actually stay quit and she understands that concept.    Her issues with the Bell's palsy and neurologic deficit are still about the same.  She has not had any increased problems with pain in her hands.    Problem   Endometriosis    Chronic pelvic pain.  On oxycodone. Still about the same  Pain around 6/10 most of the time     Tobacco Dependence Syndrome    Stopped January 1, but restarted and not smoking as much     Preeclampsia (Resolved)   Advanced Maternal Age in Multigravida, Third Trimester (Resolved)   Labor and Delivery Indication for Care Or Intervention (Resolved)   Skin Lesion (Resolved)    Had small mole in vaginal area.  Checked by GYN and told it was ok.  Used podofilyn and went away.  Now changed to different color.  Gets irritated.  Especially with sex.       Female Genital Symptoms (Resolved)    ICD-10 transition         Current Outpatient Medications Ordered in Epic   Medication Sig Dispense Refill    [START ON 9/17/2024] HYDROcodone-acetaminophen (NORCO) 7.5-325 MG tab Take 1 Tablet by mouth every 6 hours as needed for Severe Pain for up to 30 days. 120 Tablet 0    [START ON 10/17/2024] HYDROcodone-acetaminophen (NORCO) 7.5-325 MG tab Take 1 Tablet by mouth every 6 hours as needed for Severe Pain for up to 30 days. 120 Tablet 0    [START ON 11/16/2024] HYDROcodone-acetaminophen (NORCO) 7.5-325 MG tab Take 1 Tablet by mouth every 6  "hours as needed for Severe Pain for up to 30 days. 120 Tablet 0    ibuprofen (MOTRIN) 800 MG Tab Take 1 Tablet by mouth every 8 hours as needed for Mild Pain or Moderate Pain. 30 Tablet 0     No current Epic-ordered facility-administered medications on file.       Health Maintenance:     ROS:  Gen: no fevers/chills, no changes in weight  Eyes: no changes in vision  ENT: no sore throat, no hearing loss, no bloody nose  Pulm: no sob, no cough  CV: no chest pain, no palpitations  GI: no nausea/vomiting, no diarrhea  : no dysuria  MSk: no myalgias  Skin: no rash  Neuro: no headaches, no numbness/tingling  Heme/Lymph: no easy bruising      Objective:     Exam:  /78 (BP Location: Right arm, Patient Position: Sitting, BP Cuff Size: Large adult)   Pulse 91   Temp 36.9 °C (98.4 °F) (Temporal)   Ht 1.727 m (5' 8\")   Wt 90.9 kg (200 lb 8 oz)   LMP 08/15/2024 (Approximate)   SpO2 99%   Breastfeeding No   BMI 30.49 kg/m²  Body mass index is 30.49 kg/m².    Gen: Alert and oriented, No apparent distress.  Neck: Neck is supple without lymphadenopathy.  Lungs: Normal effort, CTA bilaterally, no wheezes, rhonchi, or rales  CV: Regular rate and rhythm. No murmurs, rubs, or gallops.  Ext: No clubbing, cyanosis, edema.      Labs: None    Assessment & Plan:     39 y.o. female with the following -     Problem List Items Addressed This Visit          Family Medicine Problems    Chronic pain syndrome    Relevant Medications    HYDROcodone-acetaminophen (NORCO) 7.5-325 MG tab (Start on 9/17/2024)    HYDROcodone-acetaminophen (NORCO) 7.5-325 MG tab (Start on 10/17/2024)    HYDROcodone-acetaminophen (NORCO) 7.5-325 MG tab (Start on 11/16/2024)       Other    Endometriosis     Time to refill narcotics  Now on hydrocodone 7.5mg         Relevant Medications    HYDROcodone-acetaminophen (NORCO) 7.5-325 MG tab (Start on 9/17/2024)    HYDROcodone-acetaminophen (NORCO) 7.5-325 MG tab (Start on 10/17/2024)    HYDROcodone-acetaminophen " (NORCO) 7.5-325 MG tab (Start on 11/16/2024)    Tobacco dependence syndrome     Restarted 1ppd                Return in about 3 months (around 12/4/2024) for CS refill.    Please note that this dictation was created using voice recognition software. I have made every reasonable attempt to correct obvious errors, but I expect that there are errors of grammar and possibly content that I did not discover before finalizing the note.

## 2024-12-09 ENCOUNTER — OFFICE VISIT (OUTPATIENT)
Dept: MEDICAL GROUP | Facility: CLINIC | Age: 39
End: 2024-12-09
Payer: MEDICAID

## 2024-12-09 VITALS
SYSTOLIC BLOOD PRESSURE: 120 MMHG | BODY MASS INDEX: 29.86 KG/M2 | TEMPERATURE: 98 F | OXYGEN SATURATION: 98 % | DIASTOLIC BLOOD PRESSURE: 78 MMHG | WEIGHT: 197 LBS | HEART RATE: 102 BPM | HEIGHT: 68 IN

## 2024-12-09 DIAGNOSIS — G89.4 CHRONIC PAIN SYNDROME: ICD-10-CM

## 2024-12-09 DIAGNOSIS — N80.9 ENDOMETRIOSIS: ICD-10-CM

## 2024-12-09 PROCEDURE — 99213 OFFICE O/P EST LOW 20 MIN: CPT | Performed by: FAMILY MEDICINE

## 2024-12-09 PROCEDURE — 3074F SYST BP LT 130 MM HG: CPT | Performed by: FAMILY MEDICINE

## 2024-12-09 PROCEDURE — 3078F DIAST BP <80 MM HG: CPT | Performed by: FAMILY MEDICINE

## 2024-12-09 RX ORDER — HYDROCODONE BITARTRATE AND ACETAMINOPHEN 7.5; 325 MG/1; MG/1
1 TABLET ORAL EVERY 6 HOURS PRN
Qty: 120 TABLET | Refills: 0 | Status: SHIPPED | OUTPATIENT
Start: 2025-01-13 | End: 2025-02-12

## 2024-12-09 RX ORDER — HYDROCODONE BITARTRATE AND ACETAMINOPHEN 7.5; 325 MG/1; MG/1
1 TABLET ORAL EVERY 6 HOURS PRN
Qty: 120 TABLET | Refills: 0 | Status: SHIPPED | OUTPATIENT
Start: 2024-12-14 | End: 2025-01-13

## 2024-12-09 RX ORDER — HYDROCODONE BITARTRATE AND ACETAMINOPHEN 7.5; 325 MG/1; MG/1
1 TABLET ORAL EVERY 6 HOURS PRN
Qty: 120 TABLET | Refills: 0 | Status: SHIPPED | OUTPATIENT
Start: 2025-02-12 | End: 2025-03-14

## 2024-12-09 ASSESSMENT — FIBROSIS 4 INDEX: FIB4 SCORE: 0.57

## 2024-12-09 NOTE — PROGRESS NOTES
"Subjective:     CC: Pain medicine refill    HPI:   Ally presents today with a need for refills.  She states her pain medicine is about the same as its always been.  Her main complaint is chronic pelvic pain.  This exacerbates during her menstrual cycle however is present daily.  She has not been able to decrease the amount of medication needed.  No red flags noted    Problem   Chronic Pain Syndrome    Needing refills.  Pain management stable         Current Outpatient Medications Ordered in Epic   Medication Sig Dispense Refill    [START ON 12/14/2024] HYDROcodone-acetaminophen (NORCO) 7.5-325 MG tab Take 1 Tablet by mouth every 6 hours as needed for Severe Pain for up to 30 days. 120 Tablet 0    [START ON 1/13/2025] HYDROcodone-acetaminophen (NORCO) 7.5-325 MG tab Take 1 Tablet by mouth every 6 hours as needed for Severe Pain for up to 30 days. 120 Tablet 0    [START ON 2/12/2025] HYDROcodone-acetaminophen (NORCO) 7.5-325 MG tab Take 1 Tablet by mouth every 6 hours as needed for Severe Pain for up to 30 days. 120 Tablet 0    ibuprofen (MOTRIN) 800 MG Tab Take 1 Tablet by mouth every 8 hours as needed for Mild Pain or Moderate Pain. 30 Tablet 0     No current Epic-ordered facility-administered medications on file.       Health Maintenance:     ROS:  Gen: no fevers/chills, no changes in weight  Eyes: no changes in vision  ENT: no sore throat, no hearing loss, no bloody nose  Pulm: no sob, no cough  CV: no chest pain, no palpitations  GI: no nausea/vomiting, no diarrhea  : no dysuria  MSk: no myalgias  Skin: no rash  Neuro: no headaches, no numbness/tingling  Heme/Lymph: no easy bruising      Objective:     Exam:  /78 (BP Location: Right arm, Patient Position: Sitting, BP Cuff Size: Adult)   Pulse (!) 102   Temp 36.7 °C (98 °F)   Ht 1.727 m (5' 8\")   Wt 89.4 kg (197 lb)   SpO2 98%   BMI 29.95 kg/m²  Body mass index is 29.95 kg/m².    Gen: Alert and oriented, No apparent distress.  Neck: Neck is supple " without lymphadenopathy.  Lungs: Normal effort, CTA bilaterally, no wheezes, rhonchi, or rales  CV: Regular rate and rhythm. No murmurs, rubs, or gallops.  Ext: No clubbing, cyanosis, edema.      Labs: none    Assessment & Plan:     39 y.o. female with the following -     Problem List Items Addressed This Visit          Family Medicine Problems    Chronic pain syndrome     Feeling ok.  Is still using narcotics.    Will refill         Relevant Medications    HYDROcodone-acetaminophen (NORCO) 7.5-325 MG tab (Start on 12/14/2024)    HYDROcodone-acetaminophen (NORCO) 7.5-325 MG tab (Start on 1/13/2025)    HYDROcodone-acetaminophen (NORCO) 7.5-325 MG tab (Start on 2/12/2025)       Other    Endometriosis    Relevant Medications    HYDROcodone-acetaminophen (NORCO) 7.5-325 MG tab (Start on 12/14/2024)    HYDROcodone-acetaminophen (NORCO) 7.5-325 MG tab (Start on 1/13/2025)    HYDROcodone-acetaminophen (NORCO) 7.5-325 MG tab (Start on 2/12/2025)           No follow-ups on file.    Please note that this dictation was created using voice recognition software. I have made every reasonable attempt to correct obvious errors, but I expect that there are errors of grammar and possibly content that I did not discover before finalizing the note.

## 2025-01-10 ENCOUNTER — TELEPHONE (OUTPATIENT)
Dept: MEDICAL GROUP | Facility: CLINIC | Age: 40
End: 2025-01-10
Payer: MEDICAID

## 2025-01-10 NOTE — TELEPHONE ENCOUNTER
Ally called and stated that the pharmacy does not have the rx for her norco for this month or next month. I called the pharmacy to confirm, and they do not have any orders    Thank you!

## 2025-01-14 DIAGNOSIS — N80.9 ENDOMETRIOSIS: ICD-10-CM

## 2025-01-14 DIAGNOSIS — G89.4 CHRONIC PAIN SYNDROME: ICD-10-CM

## 2025-01-15 NOTE — TELEPHONE ENCOUNTER
Received request via: Patient    Was the patient seen in the last year in this department? Yes    Does the patient have an active prescription (recently filled or refills available) for medication(s) requested? No    Pharmacy Name: [id number] - [place], [place] - [address] AT [facility]

## 2025-01-16 RX ORDER — HYDROCODONE BITARTRATE AND ACETAMINOPHEN 7.5; 325 MG/1; MG/1
1 TABLET ORAL EVERY 6 HOURS PRN
Qty: 120 TABLET | Refills: 0 | Status: SHIPPED | OUTPATIENT
Start: 2025-01-16 | End: 2025-02-15

## 2025-02-11 DIAGNOSIS — G89.4 CHRONIC PAIN SYNDROME: ICD-10-CM

## 2025-02-11 DIAGNOSIS — N80.9 ENDOMETRIOSIS: ICD-10-CM

## 2025-02-11 RX ORDER — HYDROCODONE BITARTRATE AND ACETAMINOPHEN 7.5; 325 MG/1; MG/1
1 TABLET ORAL EVERY 6 HOURS PRN
Qty: 120 TABLET | Refills: 0 | Status: SHIPPED | OUTPATIENT
Start: 2025-02-13 | End: 2025-03-15

## 2025-03-04 ENCOUNTER — APPOINTMENT (OUTPATIENT)
Dept: MEDICAL GROUP | Facility: CLINIC | Age: 40
End: 2025-03-04
Payer: MEDICAID

## 2025-03-12 ENCOUNTER — OFFICE VISIT (OUTPATIENT)
Dept: MEDICAL GROUP | Facility: CLINIC | Age: 40
End: 2025-03-12
Payer: MEDICAID

## 2025-03-12 VITALS
RESPIRATION RATE: 20 BRPM | WEIGHT: 192.25 LBS | OXYGEN SATURATION: 95 % | HEART RATE: 101 BPM | TEMPERATURE: 97.7 F | DIASTOLIC BLOOD PRESSURE: 77 MMHG | HEIGHT: 68 IN | BODY MASS INDEX: 29.14 KG/M2 | SYSTOLIC BLOOD PRESSURE: 145 MMHG

## 2025-03-12 DIAGNOSIS — G89.4 CHRONIC PAIN SYNDROME: ICD-10-CM

## 2025-03-12 DIAGNOSIS — N80.9 ENDOMETRIOSIS: ICD-10-CM

## 2025-03-12 PROCEDURE — 99213 OFFICE O/P EST LOW 20 MIN: CPT | Performed by: FAMILY MEDICINE

## 2025-03-12 PROCEDURE — 3077F SYST BP >= 140 MM HG: CPT | Performed by: FAMILY MEDICINE

## 2025-03-12 PROCEDURE — 3078F DIAST BP <80 MM HG: CPT | Performed by: FAMILY MEDICINE

## 2025-03-12 RX ORDER — HYDROCODONE BITARTRATE AND ACETAMINOPHEN 7.5; 325 MG/1; MG/1
1 TABLET ORAL EVERY 6 HOURS PRN
Qty: 120 TABLET | Refills: 0 | Status: SHIPPED | OUTPATIENT
Start: 2025-05-13 | End: 2025-06-12

## 2025-03-12 RX ORDER — HYDROCODONE BITARTRATE AND ACETAMINOPHEN 7.5; 325 MG/1; MG/1
1 TABLET ORAL EVERY 6 HOURS PRN
Qty: 120 TABLET | Refills: 0 | Status: SHIPPED | OUTPATIENT
Start: 2025-04-13 | End: 2025-05-13

## 2025-03-12 RX ORDER — HYDROCODONE BITARTRATE AND ACETAMINOPHEN 7.5; 325 MG/1; MG/1
1 TABLET ORAL EVERY 6 HOURS PRN
Qty: 120 TABLET | Refills: 0 | Status: SHIPPED | OUTPATIENT
Start: 2025-03-14 | End: 2025-04-13

## 2025-03-12 ASSESSMENT — PATIENT HEALTH QUESTIONNAIRE - PHQ9: CLINICAL INTERPRETATION OF PHQ2 SCORE: 0

## 2025-03-12 ASSESSMENT — FIBROSIS 4 INDEX: FIB4 SCORE: 0.57

## 2025-03-12 NOTE — PROGRESS NOTES
Subjective:     CC: Medication refills.    HPI:   Ally presents today with with a need for her hydrocodone refill.  Pain management has been the same.  She has been taking hydrocodone 7.5 mg 4 times a day for several years.  She does not feel any need to increase the medication however she does not feel that she can decrease that either.  She is currently asked expressing headache.  However, she feels that this will go away shortly.    Problem   Chronic Pain Syndrome    Needing refills.  Pain management stable         Current Outpatient Medications Ordered in Epic   Medication Sig Dispense Refill    [START ON 3/14/2025] HYDROcodone-acetaminophen (NORCO) 7.5-325 MG tab Take 1 Tablet by mouth every 6 hours as needed for Severe Pain for up to 30 days. 120 Tablet 0    [START ON 4/13/2025] HYDROcodone-acetaminophen (NORCO) 7.5-325 MG tab Take 1 Tablet by mouth every 6 hours as needed for Severe Pain for up to 30 days. 120 Tablet 0    [START ON 5/13/2025] HYDROcodone-acetaminophen (NORCO) 7.5-325 MG tab Take 1 Tablet by mouth every 6 hours as needed for Severe Pain for up to 30 days. 120 Tablet 0    HYDROcodone-acetaminophen (NORCO) 7.5-325 MG tab Take 1 Tablet by mouth every 6 hours as needed for Severe Pain for up to 30 days. 120 Tablet 0    ibuprofen (MOTRIN) 800 MG Tab Take 1 Tablet by mouth every 8 hours as needed for Mild Pain or Moderate Pain. 30 Tablet 0     No current Epic-ordered facility-administered medications on file.       Health Maintenance:     ROS:  Gen: no fevers/chills, no changes in weight  Eyes: no changes in vision  ENT: no sore throat, no hearing loss, no bloody nose  Pulm: no sob, no cough  CV: no chest pain, no palpitations  GI: no nausea/vomiting, no diarrhea  : no dysuria  MSk: no myalgias  Skin: no rash  Neuro: no headaches, no numbness/tingling  Heme/Lymph: no easy bruising      Objective:     Exam:  BP (!) 145/77 (BP Location: Right arm, Patient Position: Sitting, BP Cuff Size: Adult)   " Pulse (!) 101   Temp 36.5 °C (97.7 °F) (Temporal)   Resp 20   Ht 1.727 m (5' 8\")   Wt 87.2 kg (192 lb 4 oz)   SpO2 95%   BMI 29.23 kg/m²  Body mass index is 29.23 kg/m².    Gen: Alert and oriented, No apparent distress.  Neck: Neck is supple without lymphadenopathy.  Lungs: Normal effort, CTA bilaterally, no wheezes, rhonchi, or rales  CV: Regular rate and rhythm. No murmurs, rubs, or gallops.  Ext: No clubbing, cyanosis, edema.      Labs: None    Assessment & Plan:     39 y.o. female with the following -     Problem List Items Addressed This Visit          Family Medicine Problems    Chronic pain syndrome    Needing refills.  3 refills made for 3 months.  No red flags.  She will need to return in 3 months for further refills.         Relevant Medications    HYDROcodone-acetaminophen (NORCO) 7.5-325 MG tab (Start on 3/14/2025)    HYDROcodone-acetaminophen (NORCO) 7.5-325 MG tab (Start on 4/13/2025)    HYDROcodone-acetaminophen (NORCO) 7.5-325 MG tab (Start on 5/13/2025)       Other    Endometriosis    Relevant Medications    HYDROcodone-acetaminophen (NORCO) 7.5-325 MG tab (Start on 3/14/2025)    HYDROcodone-acetaminophen (NORCO) 7.5-325 MG tab (Start on 4/13/2025)    HYDROcodone-acetaminophen (NORCO) 7.5-325 MG tab (Start on 5/13/2025)           Return in about 3 months (around 6/12/2025) for med refill.    Please note that this dictation was created using voice recognition software. I have made every reasonable attempt to correct obvious errors, but I expect that there are errors of grammar and possibly content that I did not discover before finalizing the note.        "

## 2025-03-12 NOTE — ASSESSMENT & PLAN NOTE
Needing refills.  3 refills made for 3 months.  No red flags.  She will need to return in 3 months for further refills.

## 2025-04-07 DIAGNOSIS — G89.4 CHRONIC PAIN SYNDROME: ICD-10-CM

## 2025-04-07 DIAGNOSIS — N80.9 ENDOMETRIOSIS: ICD-10-CM

## 2025-04-07 RX ORDER — HYDROCODONE BITARTRATE AND ACETAMINOPHEN 7.5; 325 MG/1; MG/1
1 TABLET ORAL EVERY 6 HOURS PRN
Qty: 120 TABLET | Refills: 0 | Status: SHIPPED | OUTPATIENT
Start: 2025-04-13 | End: 2025-05-13

## 2025-06-02 ENCOUNTER — APPOINTMENT (OUTPATIENT)
Dept: MEDICAL GROUP | Facility: CLINIC | Age: 40
End: 2025-06-02
Payer: MEDICAID

## 2025-06-02 VITALS
HEIGHT: 68 IN | OXYGEN SATURATION: 96 % | DIASTOLIC BLOOD PRESSURE: 78 MMHG | SYSTOLIC BLOOD PRESSURE: 113 MMHG | TEMPERATURE: 98 F | WEIGHT: 186 LBS | BODY MASS INDEX: 28.19 KG/M2 | HEART RATE: 86 BPM

## 2025-06-02 DIAGNOSIS — N80.9 ENDOMETRIOSIS: ICD-10-CM

## 2025-06-02 DIAGNOSIS — G89.4 CHRONIC PAIN SYNDROME: ICD-10-CM

## 2025-06-02 DIAGNOSIS — F17.200 TOBACCO DEPENDENCE SYNDROME: Primary | ICD-10-CM

## 2025-06-02 PROCEDURE — 3078F DIAST BP <80 MM HG: CPT | Performed by: FAMILY MEDICINE

## 2025-06-02 PROCEDURE — 3074F SYST BP LT 130 MM HG: CPT | Performed by: FAMILY MEDICINE

## 2025-06-02 PROCEDURE — 99214 OFFICE O/P EST MOD 30 MIN: CPT | Performed by: FAMILY MEDICINE

## 2025-06-02 RX ORDER — HYDROCODONE BITARTRATE AND ACETAMINOPHEN 7.5; 325 MG/1; MG/1
1 TABLET ORAL EVERY 6 HOURS PRN
Qty: 120 TABLET | Refills: 0 | Status: SHIPPED | OUTPATIENT
Start: 2025-08-10 | End: 2025-09-09

## 2025-06-02 RX ORDER — HYDROCODONE BITARTRATE AND ACETAMINOPHEN 7.5; 325 MG/1; MG/1
1 TABLET ORAL EVERY 6 HOURS PRN
Qty: 120 TABLET | Refills: 0 | Status: SHIPPED | OUTPATIENT
Start: 2025-06-11 | End: 2025-07-11

## 2025-06-02 RX ORDER — HYDROCODONE BITARTRATE AND ACETAMINOPHEN 7.5; 325 MG/1; MG/1
1 TABLET ORAL EVERY 6 HOURS PRN
Qty: 120 TABLET | Refills: 0 | Status: SHIPPED | OUTPATIENT
Start: 2025-07-11 | End: 2025-08-10

## 2025-06-02 ASSESSMENT — FIBROSIS 4 INDEX: FIB4 SCORE: 0.57

## 2025-06-02 NOTE — PROGRESS NOTES
"Subjective:     CC: med refills    HPI:   Ally presents today with primary need for medication refills.  She has had really no interim problems.  She still has mainly chronic pelvic pain from chronic endometriosis.  She is taking four hydrocodone per day.  She is somewhat stressed out because of 2 little kids running around the house all the time.  She actually started smoking again.  We talked with her about things that will help her be more interested in stopping smoking 2.  She said she is attempted to stop 4 times now.    Problem   Endometriosis    Chronic pelvic pain.  On oxycodone. Still about the same  Pain around 6/10 most of the time     Chronic Pain Syndrome    Needing refills.  Pain management stable     Tobacco Dependence Syndrome    Stopped January 1, but restarted and not smoking as much         Current Medications and Prescriptions Ordered in [facility][1]    Health Maintenance:     ROS:  Gen: no fevers/chills, no changes in weight  Eyes: no changes in vision  ENT: no sore throat, no hearing loss, no bloody nose  Pulm: no sob, no cough  CV: no chest pain, no palpitations  GI: no nausea/vomiting, no diarrhea  : no dysuria  MSk: no myalgias  Skin: no rash  Neuro: no headaches, no numbness/tingling  Heme/Lymph: no easy bruising      Objective:     Exam:  /78 (BP Location: Right arm, Patient Position: Sitting, BP Cuff Size: Adult)   Pulse 86   Temp 36.7 °C (98 °F)   Ht 1.727 m (5' 8\")   Wt 84.4 kg (186 lb)   SpO2 96%   BMI 28.28 kg/m²  Body mass index is 28.28 kg/m².    Gen: Alert and oriented, No apparent distress.  Neck: Neck is supple without lymphadenopathy.  Lungs: Normal effort, CTA bilaterally, no wheezes, rhonchi, or rales  CV: Regular rate and rhythm. No murmurs, rubs, or gallops.  Ext: No clubbing, cyanosis, edema.      Labs: None    Assessment & Plan:     39 y.o. female with the following -     Problem List Items Addressed This Visit          Family Medicine Problems    Chronic pain " syndrome    Needing refills.  Pain management stable         Relevant Medications    HYDROcodone-acetaminophen (NORCO) 7.5-325 MG tab (Start on 6/11/2025)    HYDROcodone-acetaminophen (NORCO) 7.5-325 MG tab (Start on 7/11/2025)    HYDROcodone-acetaminophen (NORCO) 7.5-325 MG tab (Start on 8/10/2025)       Other    Endometriosis    Still chronic pain.  Requesting HC refill         Relevant Medications    HYDROcodone-acetaminophen (NORCO) 7.5-325 MG tab (Start on 6/11/2025)    HYDROcodone-acetaminophen (NORCO) 7.5-325 MG tab (Start on 7/11/2025)    HYDROcodone-acetaminophen (NORCO) 7.5-325 MG tab (Start on 8/10/2025)    Tobacco dependence syndrome - Primary    Restarted.   Has patches.                Return in about 3 months (around 9/2/2025) for med refills.    Please note that this dictation was created using voice recognition software. I have made every reasonable attempt to correct obvious errors, but I expect that there are errors of grammar and possibly content that I did not discover before finalizing the note.             [1]   Current Outpatient Medications Ordered in Epic   Medication Sig Dispense Refill    [START ON 6/11/2025] HYDROcodone-acetaminophen (NORCO) 7.5-325 MG tab Take 1 Tablet by mouth every 6 hours as needed for Severe Pain for up to 30 days. 120 Tablet 0    [START ON 7/11/2025] HYDROcodone-acetaminophen (NORCO) 7.5-325 MG tab Take 1 Tablet by mouth every 6 hours as needed for Severe Pain for up to 30 days. 120 Tablet 0    [START ON 8/10/2025] HYDROcodone-acetaminophen (NORCO) 7.5-325 MG tab Take 1 Tablet by mouth every 6 hours as needed for Severe Pain for up to 30 days. 120 Tablet 0     No current Epic-ordered facility-administered medications on file.

## 2025-09-02 ENCOUNTER — APPOINTMENT (OUTPATIENT)
Dept: MEDICAL GROUP | Facility: CLINIC | Age: 40
End: 2025-09-02
Payer: MEDICAID

## 2025-09-09 ENCOUNTER — APPOINTMENT (OUTPATIENT)
Dept: MEDICAL GROUP | Facility: CLINIC | Age: 40
End: 2025-09-09
Payer: MEDICAID

## (undated) DEVICE — SUTURE 0 VICRYL PLUS CT-1 - 36 INCH (36/BX)

## (undated) DEVICE — SUTURE 0 COATED VICRYL 6-18IN - (12PK/BX)

## (undated) DEVICE — TROCAR STEP 11MM - (3/CA)

## (undated) DEVICE — GLOVE SZ 6.5 BIOGEL PI MICRO - PF LF (50PR/BX)

## (undated) DEVICE — LACTATED RINGERS INJ 1000 ML - (14EA/CA 60CA/PF)

## (undated) DEVICE — TROCAR STEP 5MM - (3/CA)

## (undated) DEVICE — GLOVE BIOGEL SZ 6.5 SURGICAL PF LTX (50PR/BX 4BX/CA)

## (undated) DEVICE — SUTURE 0 VICRYL PLUS CT-1 - 8 X 18 INCH (12/BX)

## (undated) DEVICE — GLOVE BIOGEL ECLIPSE PF LATEX SIZE 8.5 (50PR/BX)

## (undated) DEVICE — ELECTRODE DUAL RETURN W/ CORD - (50/PK)

## (undated) DEVICE — GLOVE BIOGEL PI INDICATOR SZ 6.5 SURGICAL PF LF - (50/BX 4BX/CA)

## (undated) DEVICE — GLOVE BIOGEL PI INDICATOR SZ 7.0 SURGICAL PF LF - (50/BX 4BX/CA)

## (undated) DEVICE — GLOVE BIOGEL INDICATOR SZ 6.5 SURGICAL PF LTX - (50PR/BX 4BX/CA)

## (undated) DEVICE — TRAY CATHETER FOLEY URINE METER W/STATLOCK 350ML (10EA/CA)

## (undated) DEVICE — SUTURE 2-0 VICRYL PLUS SH - 27 INCH (36/BX)

## (undated) DEVICE — NEEDLE INSUFFLATION FOR STEP - (12/BX)

## (undated) DEVICE — SET IRRIGATION CYSTOSCOPY TUBE L80 IN (20EA/CA)

## (undated) DEVICE — WATER IRRIG. STER 3000 ML - (4/CA)

## (undated) DEVICE — PACK LAPAROSCOPY - (1/CA)

## (undated) DEVICE — PACK MINOR BASIN - (2EA/CA)

## (undated) DEVICE — SUTURE 4-0 VICRYL PLUS FS-2 - 27 INCH (36/BX)